# Patient Record
Sex: MALE | Race: WHITE | NOT HISPANIC OR LATINO | Employment: FULL TIME | URBAN - METROPOLITAN AREA
[De-identification: names, ages, dates, MRNs, and addresses within clinical notes are randomized per-mention and may not be internally consistent; named-entity substitution may affect disease eponyms.]

---

## 2017-02-06 RX ORDER — INSULIN GLARGINE 100 [IU]/ML
INJECTION, SOLUTION SUBCUTANEOUS
Qty: 30 ML | Refills: 1 | Status: SHIPPED | OUTPATIENT
Start: 2017-02-06 | End: 2017-02-10 | Stop reason: SDUPTHER

## 2017-02-07 ENCOUNTER — TELEPHONE (OUTPATIENT)
Dept: FAMILY MEDICINE | Facility: CLINIC | Age: 57
End: 2017-02-07

## 2017-02-07 NOTE — TELEPHONE ENCOUNTER
----- Message from Clarissa Henry sent at 2/7/2017  8:25 AM CST -----  Contact: self  [phone]  Pt is returning your office call. Pls call pt [phone]. Thanks...............Yina

## 2017-02-10 ENCOUNTER — LAB VISIT (OUTPATIENT)
Dept: LAB | Facility: HOSPITAL | Age: 57
End: 2017-02-10
Attending: FAMILY MEDICINE
Payer: COMMERCIAL

## 2017-02-10 ENCOUNTER — OFFICE VISIT (OUTPATIENT)
Dept: FAMILY MEDICINE | Facility: CLINIC | Age: 57
End: 2017-02-10
Payer: COMMERCIAL

## 2017-02-10 VITALS
DIASTOLIC BLOOD PRESSURE: 90 MMHG | HEART RATE: 82 BPM | SYSTOLIC BLOOD PRESSURE: 158 MMHG | OXYGEN SATURATION: 97 % | BODY MASS INDEX: 37.3 KG/M2 | WEIGHT: 266.44 LBS | HEIGHT: 71 IN | TEMPERATURE: 98 F

## 2017-02-10 DIAGNOSIS — M10.00 IDIOPATHIC GOUT, UNSPECIFIED CHRONICITY, UNSPECIFIED SITE: ICD-10-CM

## 2017-02-10 DIAGNOSIS — E78.5 HYPERLIPIDEMIA, UNSPECIFIED HYPERLIPIDEMIA TYPE: ICD-10-CM

## 2017-02-10 DIAGNOSIS — Z23 NEED FOR PROPHYLACTIC VACCINATION AND INOCULATION AGAINST INFLUENZA: ICD-10-CM

## 2017-02-10 DIAGNOSIS — I10 ESSENTIAL HYPERTENSION: ICD-10-CM

## 2017-02-10 PROCEDURE — 2022F DILAT RTA XM EVC RTNOPTHY: CPT | Mod: S$GLB,,, | Performed by: FAMILY MEDICINE

## 2017-02-10 PROCEDURE — 83036 HEMOGLOBIN GLYCOSYLATED A1C: CPT

## 2017-02-10 PROCEDURE — 4010F ACE/ARB THERAPY RXD/TAKEN: CPT | Mod: S$GLB,,, | Performed by: FAMILY MEDICINE

## 2017-02-10 PROCEDURE — 3080F DIAST BP >= 90 MM HG: CPT | Mod: S$GLB,,, | Performed by: FAMILY MEDICINE

## 2017-02-10 PROCEDURE — 3045F PR MOST RECENT HEMOGLOBIN A1C LEVEL 7.0-9.0%: CPT | Mod: S$GLB,,, | Performed by: FAMILY MEDICINE

## 2017-02-10 PROCEDURE — 90686 IIV4 VACC NO PRSV 0.5 ML IM: CPT | Mod: S$GLB,,, | Performed by: FAMILY MEDICINE

## 2017-02-10 PROCEDURE — 3077F SYST BP >= 140 MM HG: CPT | Mod: S$GLB,,, | Performed by: FAMILY MEDICINE

## 2017-02-10 PROCEDURE — 90471 IMMUNIZATION ADMIN: CPT | Mod: S$GLB,,, | Performed by: FAMILY MEDICINE

## 2017-02-10 PROCEDURE — 99214 OFFICE O/P EST MOD 30 MIN: CPT | Mod: 25,S$GLB,, | Performed by: FAMILY MEDICINE

## 2017-02-10 PROCEDURE — 99999 PR PBB SHADOW E&M-EST. PATIENT-LVL III: CPT | Mod: PBBFAC,,, | Performed by: FAMILY MEDICINE

## 2017-02-10 PROCEDURE — 36415 COLL VENOUS BLD VENIPUNCTURE: CPT | Mod: PO

## 2017-02-10 RX ORDER — ALLOPURINOL 100 MG/1
100 TABLET ORAL DAILY
Qty: 90 TABLET | Refills: 3 | Status: SHIPPED | OUTPATIENT
Start: 2017-02-10 | End: 2018-04-17 | Stop reason: SDUPTHER

## 2017-02-10 RX ORDER — INSULIN LISPRO 100 [IU]/ML
35 INJECTION, SOLUTION INTRAVENOUS; SUBCUTANEOUS
Qty: 10 VIAL | Refills: 6 | Status: SHIPPED | OUTPATIENT
Start: 2017-02-10 | End: 2018-07-16 | Stop reason: SDUPTHER

## 2017-02-10 RX ORDER — METFORMIN HYDROCHLORIDE 1000 MG/1
1000 TABLET ORAL 2 TIMES DAILY WITH MEALS
Qty: 180 TABLET | Refills: 3 | Status: SHIPPED | OUTPATIENT
Start: 2017-02-10 | End: 2018-04-17 | Stop reason: SDUPTHER

## 2017-02-10 RX ORDER — ENALAPRIL MALEATE 20 MG/1
20 TABLET ORAL 2 TIMES DAILY
Qty: 180 TABLET | Refills: 3 | Status: SHIPPED | OUTPATIENT
Start: 2017-02-10 | End: 2018-07-16 | Stop reason: SDUPTHER

## 2017-02-10 RX ORDER — INSULIN GLARGINE 100 [IU]/ML
INJECTION, SOLUTION SUBCUTANEOUS
Qty: 30 ML | Refills: 1 | Status: SHIPPED | OUTPATIENT
Start: 2017-02-10 | End: 2017-06-22 | Stop reason: SDUPTHER

## 2017-02-10 RX ORDER — AMLODIPINE BESYLATE 10 MG/1
10 TABLET ORAL DAILY
Qty: 90 TABLET | Refills: 3 | Status: SHIPPED | OUTPATIENT
Start: 2017-02-10 | End: 2018-01-16 | Stop reason: SDUPTHER

## 2017-02-10 RX ORDER — ATENOLOL AND CHLORTHALIDONE TABLET 50; 25 MG/1; MG/1
1 TABLET ORAL DAILY
Qty: 90 TABLET | Refills: 3 | Status: SHIPPED | OUTPATIENT
Start: 2017-02-10 | End: 2018-04-17 | Stop reason: SDUPTHER

## 2017-02-10 RX ORDER — ATORVASTATIN CALCIUM 40 MG/1
40 TABLET, FILM COATED ORAL DAILY
Qty: 90 TABLET | Refills: 3 | Status: SHIPPED | OUTPATIENT
Start: 2017-02-10 | End: 2018-07-16 | Stop reason: SDUPTHER

## 2017-02-10 NOTE — PROGRESS NOTES
[facility]  Progress Note    SUBJECTIVE:     Chief Complaint   Patient presents with    [facility]    Diabetes       HPI   Connor Zuluaga  is a 56 y.o. male here to establish care and for follow up of his diabetes, HTN. He admits been taking his meds but been eating/drinking bad. He drinks diet coke daily. He is trying to cut back. Patient has no other new complaints/problems at this time.      Review of patient's allergies indicates:  No Known Allergies    Past Medical History   Diagnosis Date    Diabetes mellitus, type 2     Gout     Hyperlipidemia     Hypertension      Past Surgical History   Procedure Laterality Date    Tonsillectomy       Family History   Problem Relation Age of Onset    Hyperlipidemia Mother     Hypertension Mother     Diabetes Mother     Hyperlipidemia Father     Hypertension Father     Diabetes Father     Cancer Father      prostate, skin cancer, polyps in intestine     Social History   Substance Use Topics    Smoking status: Current Every Day Smoker     Packs/day: 1.00    Smokeless tobacco: Never Used    Alcohol use Yes        Review of Systems   Constitutional: Negative for chills, fever and malaise/fatigue.   HENT: Negative.    Respiratory: Negative.  Negative for cough and shortness of breath.    Cardiovascular: Negative.  Negative for chest pain.   Gastrointestinal: Negative.  Negative for abdominal pain, nausea and vomiting.   Genitourinary: Negative.    Neurological: Negative for weakness and headaches.   All other systems reviewed and are negative.    OBJECTIVE:     Vitals:    02/10/17 1434   BP: (!) 158/90   Pulse: 82   Temp: 98.2 °F (36.8 °C)     Body mass index is 37.16 kg/(m^2).    Physical Exam   Constitutional: He is oriented to person, place, and time and well-developed, well-nourished, and in no distress. No distress.   HENT:   Head: Normocephalic and atraumatic.   Eyes: Conjunctivae and EOM are normal.   Cardiovascular: Normal rate, regular  rhythm and normal heart sounds.  Exam reveals no gallop and no friction rub.    No murmur heard.  Pulmonary/Chest: Effort normal and breath sounds normal. No respiratory distress. He has no wheezes. He has no rales.   Abdominal: Soft. Bowel sounds are normal. He exhibits no distension. There is no tenderness.   Neurological: He is alert and oriented to person, place, and time.   Skin: Skin is warm. He is not diaphoretic.     Protective Sensation (w/ 10 gram monofilament):  Right: Intact  Left: Intact    Visual Inspection:  Normal -  Bilateral    Pedal Pulses:   Right: Present  Left: Present    Posterior tibialis:   Right:Present  Left: Present        Old records were reviewed. Labs and/or images were independently reviewed.    ASSESSMENT     1. Type 2 diabetes, uncontrolled, with neuropathy    2. Essential hypertension    3. Idiopathic gout, unspecified chronicity, unspecified site    4. Hyperlipidemia, unspecified hyperlipidemia type    5. Need for prophylactic vaccination and inoculation against influenza        PLAN:     Type 2 diabetes, uncontrolled, with neuropathy  -     insulin glargine (LANTUS) 100 unit/mL injection; INJECT 85 UNITS UNDER THE SKIN AT BEDTIME  Dispense: 30 mL; Refill: 1  -     insulin lispro (HUMALOG) 100 unit/mL injection; Inject 35 Units into the skin 3 (three) times daily before meals.  Dispense: 10 vial; Refill: 6  -     Increase metformin (GLUCOPHAGE) 1000 MG tablet; Take 1 tablet (1,000 mg total) by mouth 2 (two) times daily with meals.  Dispense: 180 tablet; Refill: 3  -     Ambulatory referral to Optometry  -     Diabetic Eye Screening Photo; Future  -     Instructed patient to take daily glucose AM logs and to write them down to bring with on next visit. Advised patient to decrease intake of carbohydrates/simple sugars.         Essential hypertension  -     atenolol-chlorthalidone (TENORETIC) 50-25 mg Tab; Take 1 tablet by mouth once daily.  Dispense: 90 tablet; Refill: 3  -      enalapril (VASOTEC) 20 MG tablet; Take 1 tablet (20 mg total) by mouth 2 (two) times daily.  Dispense: 180 tablet; Refill: 3  -     Increase amlodipine (NORVASC) 10 MG tablet; Take 1 tablet (10 mg total) by mouth once daily.  Dispense: 90 tablet; Refill: 3  -     Educated patient to take medications as prescribed, and to record bp logs daily to bring along to next visit. Instructed patient to decrease salt intake. Also told patient to call if any new signs or symptoms develop.    Idiopathic gout, unspecified chronicity, unspecified site  -     allopurinol (ZYLOPRIM) 100 MG tablet; Take 1 tablet (100 mg total) by mouth once daily.  Dispense: 90 tablet; Refill: 3   -     Stable. Continue current regimen.    Hyperlipidemia, unspecified hyperlipidemia type  -     atorvastatin (LIPITOR) 40 MG tablet; Take 1 tablet (40 mg total) by mouth once daily.  Dispense: 90 tablet; Refill: 3  -     Stable. Continue current regimen.    Need for prophylactic vaccination and inoculation against influenza  -     Influenza - Quadrivalent (3 years & older) (PF)      RTC PRN or 1 month for BP, DM follow-up.    Zaire Rodrigues MD  02/10/2017 2:49 PM

## 2017-02-11 LAB
ESTIMATED AVG GLUCOSE: 226 MG/DL
HBA1C MFR BLD HPLC: 9.5 %

## 2017-02-17 ENCOUNTER — OFFICE VISIT (OUTPATIENT)
Dept: OPTOMETRY | Facility: CLINIC | Age: 57
End: 2017-02-17
Payer: COMMERCIAL

## 2017-02-17 DIAGNOSIS — H52.7 REFRACTIVE ERROR: ICD-10-CM

## 2017-02-17 DIAGNOSIS — E11.9 TYPE 2 DIABETES MELLITUS WITHOUT OPHTHALMIC MANIFESTATIONS: Primary | ICD-10-CM

## 2017-02-17 DIAGNOSIS — H25.13 NUCLEAR SCLEROSIS, BILATERAL: ICD-10-CM

## 2017-02-17 PROCEDURE — 92004 COMPRE OPH EXAM NEW PT 1/>: CPT | Mod: S$GLB,,, | Performed by: OPTOMETRIST

## 2017-02-17 PROCEDURE — 92015 DETERMINE REFRACTIVE STATE: CPT | Mod: S$GLB,,, | Performed by: OPTOMETRIST

## 2017-02-17 PROCEDURE — 99999 PR PBB SHADOW E&M-EST. PATIENT-LVL II: CPT | Mod: PBBFAC,,, | Performed by: OPTOMETRIST

## 2017-02-17 NOTE — LETTER
February 17, 2017      Zaire Rodrigues MD  [address]  [place] [address]           [facility]  [address]  [place] [address]  Phone: [phone]  Fax: [phone]          Patient: Connor Zuluaga   MR Number: [id number]   YOB: 1960   Date of Visit: 2/17/2017       Dear Dr. Zaire Rodrigues:    Thank you for referring Connor Zuluaga to me for evaluation. Attached you will find relevant portions of my assessment and plan of care.    If you have questions, please do not hesitate to call me. I look forward to following Connor Zuluaga along with you.    Sincerely,    Yvonne Chrisite, OD    Enclosure  CC:  No Recipients    If you would like to receive this communication electronically, please contact [email or web address] or [phone] to request more information on [facility] Link access.    For providers and/or their staff who would like to refer a patient to [facility], please contact us through our one-stop-shop provider referral line, [facility] [place], at [phone].    If you feel you have received this communication in error or would no longer like to receive these types of communications, please e-mail [email or web address]

## 2017-02-17 NOTE — MR AVS SNAPSHOT
[facility]  [address]  [place] [address]  Phone: [phone]  Fax: [phone]                  Connor Zuluaga   2017 2:00 PM   Office Visit    Description:  Male : 1960   Provider:  Yvonne Christie OD   Department:  [facility]           Reason for Visit     Concerns About Ocular Health     Diabetic Eye Exam     Hypertensive Eye Exam           Diagnoses this Visit        Comments    Type 2 diabetes mellitus without ophthalmic manifestations    -  Primary     Nuclear sclerosis, bilateral         Refractive error                To Do List           Goals (5 Years of Data)     None      Follow-Up and Disposition     Return in about 1 year (around 2018) for Diabetic Eye Exam.      [facility] On Call     [facility] On Call Nurse Care Line -  Assistance  Registered nurses in the [facility] On Call Center provide clinical advisement, health education, appointment booking, and other advisory services.  Call for this free service at [phone].             Medications           Message regarding Medications     Verify the changes and/or additions to your medication regime listed below are the same as discussed with your clinician today.  If any of these changes or additions are incorrect, please notify your healthcare provider.             Verify that the below list of medications is an accurate representation of the medications you are currently taking.  If none reported, the list may be blank. If incorrect, please contact your healthcare provider. Carry this list with you in case of emergency.           Current Medications     allopurinol (ZYLOPRIM) 100 MG tablet Take 1 tablet (100 mg total) by mouth once daily.    amlodipine (NORVASC) 10 MG tablet Take 1 tablet (10 mg total) by mouth once daily.    atenolol-chlorthalidone (TENORETIC) 50-25 mg Tab Take 1 tablet by mouth once daily.    atorvastatin (LIPITOR) 40 MG tablet Take 1 tablet (40 mg total) by mouth once daily.     enalapril (VASOTEC) 20 MG tablet Take 1 tablet (20 mg total) by mouth 2 (two) times daily.    insulin glargine (LANTUS) 100 unit/mL injection INJECT 85 UNITS UNDER THE SKIN AT BEDTIME    insulin lispro (HUMALOG) 100 unit/mL injection Inject 35 Units into the skin 3 (three) times daily before meals.    metformin (GLUCOPHAGE) 1000 MG tablet Take 1 tablet (1,000 mg total) by mouth 2 (two) times daily with meals.    omeprazole (PRILOSEC) 40 MG capsule Take 1 capsule (40 mg total) by mouth once daily.           Clinical Reference Information           Allergies as of 2/17/2017     No Known Allergies      Immunizations Administered on Date of Encounter - 2/17/2017     None      [facility] Sign-Up     Activating your [facility] account is as easy as 1-2-3!     1) Visit [email or web address], select Sign Up Now, enter this activation code and your date of birth, then select Next.  [email or web address]  Expires: 4/3/2017  4:05 PM      2) Create a username and password to use when you visit [facility] in the future and select a security question in case you lose your password and select Next.    3) Enter your e-mail address and click Sign Up!    Additional Information  If you have questions, please e-mail [email or web address] or call [phone] to talk to our [facility] staff. Remember, [facility] is NOT to be used for urgent needs. For medical emergencies, dial 911.         Smoking Cessation     If you would like to quit smoking:   You may be eligible for free services if you are a [place] resident and started smoking cigarettes before September 1, 1988.  Call the [employer]oking [employer]essation [employer] (SCT) toll free at (814) 334-8319 or (272) 830-8460.   Call 9-523-QUIT-NOW if you do not meet the above criteria.            Language Assistance Services     ATTENTION: Language assistance services are available, free of charge. Please call [phone].      ATENCIÓN: Si habla español, tiene a sales disposición servicios gratuitos de asistencia  lingüística. Herbie al [phone].     PETER Ý: N?u b?n nói Ti?ng Vi?t, có các d?ch v? h? tr? ngôn ng? mi?n phí dành cho b?n. G?i s? [phone].         [facility] complies with applicable Federal civil rights laws and does not discriminate on the basis of race, color, national origin, age, disability, or sex.

## 2017-02-17 NOTE — PROGRESS NOTES
HPI     Dls: 3 yrs [place]     Pt here for diabetic and hypertensive eye exam.   Pt c/o blurry vision at distance and near ou. Pt wears pal's. Pt states no   tearing no itching no burning no  Pain no ha's no floaters.     Eye meds:   None    Hemoglobin A1C       Date                     Value               Ref Range             Status                02/10/2017               9.5 (H)             4.5 - 6.2 %           Final                 05/20/2016               8.8 (H)             4.5 - 6.2 %           Final            ----------    LBS - 220s per patient    Family OHx  (--) glaucoma  (--) AMD         Last edited by Yvonne Christie, OD on 2/17/2017  2:41 PM.     Assessment /Plan     For exam results, see Encounter Report.    Type 2 diabetes mellitus without ophthalmic manifestations  - No diabetic retinopathy. Educated patient on importance of good blood sugar control, compliance with meds, and follow up care with PCP. Return in 1 year for dilated eye exam, sooner PRN.    Nuclear sclerosis, bilateral  - Not visually significant. Educated pt on presence of cataracts and effects on vision. No surgery at this time. Recheck in one year.    Refractive error  - Educated patient on refractive error and discussed lens options. Gave pt Rx for specs. RTC in 1 year.    Eyeglass Final Rx     Eyeglass Final Rx      Sphere Cylinder Axis Add   Right -5.00 +0.50 130 +2.00   Left -4.00 +0.75 045 +2.00       Type:  PAL    Expiration Date:  2/18/2018

## 2017-06-22 RX ORDER — INSULIN GLARGINE 100 [IU]/ML
INJECTION, SOLUTION SUBCUTANEOUS
Qty: 30 ML | Refills: 3 | Status: SHIPPED | OUTPATIENT
Start: 2017-06-22 | End: 2017-11-08 | Stop reason: SDUPTHER

## 2017-07-06 DIAGNOSIS — E11.9 TYPE 2 DIABETES MELLITUS WITHOUT COMPLICATION: ICD-10-CM

## 2017-07-07 DIAGNOSIS — K21.9 GASTROESOPHAGEAL REFLUX DISEASE WITHOUT ESOPHAGITIS: ICD-10-CM

## 2017-07-08 RX ORDER — OMEPRAZOLE 40 MG/1
40 CAPSULE, DELAYED RELEASE ORAL DAILY
Qty: 90 CAPSULE | Refills: 3 | Status: SHIPPED | OUTPATIENT
Start: 2017-07-08 | End: 2018-07-16 | Stop reason: SDUPTHER

## 2017-09-01 DIAGNOSIS — E11.9 TYPE 2 DIABETES MELLITUS WITHOUT COMPLICATION: ICD-10-CM

## 2017-11-08 RX ORDER — INSULIN GLARGINE 100 [IU]/ML
INJECTION, SOLUTION SUBCUTANEOUS
Qty: 30 ML | Refills: 2 | Status: SHIPPED | OUTPATIENT
Start: 2017-11-08 | End: 2018-02-23 | Stop reason: SDUPTHER

## 2017-11-21 ENCOUNTER — LAB VISIT (OUTPATIENT)
Dept: LAB | Facility: HOSPITAL | Age: 57
End: 2017-11-21
Attending: FAMILY MEDICINE
Payer: COMMERCIAL

## 2017-11-21 ENCOUNTER — OFFICE VISIT (OUTPATIENT)
Dept: FAMILY MEDICINE | Facility: CLINIC | Age: 57
End: 2017-11-21
Payer: COMMERCIAL

## 2017-11-21 VITALS
HEIGHT: 71 IN | OXYGEN SATURATION: 96 % | TEMPERATURE: 98 F | BODY MASS INDEX: 36.82 KG/M2 | SYSTOLIC BLOOD PRESSURE: 149 MMHG | HEART RATE: 81 BPM | WEIGHT: 263 LBS | DIASTOLIC BLOOD PRESSURE: 89 MMHG

## 2017-11-21 DIAGNOSIS — Z00.00 ROUTINE PHYSICAL EXAMINATION: ICD-10-CM

## 2017-11-21 DIAGNOSIS — E78.5 HYPERLIPIDEMIA, UNSPECIFIED HYPERLIPIDEMIA TYPE: ICD-10-CM

## 2017-11-21 DIAGNOSIS — Z00.00 ROUTINE PHYSICAL EXAMINATION: Primary | ICD-10-CM

## 2017-11-21 DIAGNOSIS — I10 ESSENTIAL HYPERTENSION: ICD-10-CM

## 2017-11-21 DIAGNOSIS — Z23 PNEUMOCOCCAL VACCINATION ADMINISTERED AT CURRENT VISIT: ICD-10-CM

## 2017-11-21 LAB
ALBUMIN SERPL BCP-MCNC: 4.1 G/DL
ALP SERPL-CCNC: 108 U/L
ALT SERPL W/O P-5'-P-CCNC: 32 U/L
ANION GAP SERPL CALC-SCNC: 10 MMOL/L
AST SERPL-CCNC: 24 U/L
BASOPHILS # BLD AUTO: 0.05 K/UL
BASOPHILS NFR BLD: 0.6 %
BILIRUB SERPL-MCNC: 0.8 MG/DL
BUN SERPL-MCNC: 13 MG/DL
CALCIUM SERPL-MCNC: 10.2 MG/DL
CHLORIDE SERPL-SCNC: 99 MMOL/L
CHOLEST SERPL-MCNC: 182 MG/DL
CHOLEST/HDLC SERPL: 4.7 {RATIO}
CO2 SERPL-SCNC: 31 MMOL/L
COMPLEXED PSA SERPL-MCNC: 0.49 NG/ML
CREAT SERPL-MCNC: 0.9 MG/DL
DIFFERENTIAL METHOD: ABNORMAL
EOSINOPHIL # BLD AUTO: 0.2 K/UL
EOSINOPHIL NFR BLD: 2.5 %
ERYTHROCYTE [DISTWIDTH] IN BLOOD BY AUTOMATED COUNT: 13.4 %
EST. GFR  (AFRICAN AMERICAN): >60 ML/MIN/1.73 M^2
EST. GFR  (NON AFRICAN AMERICAN): >60 ML/MIN/1.73 M^2
ESTIMATED AVG GLUCOSE: 143 MG/DL
GLUCOSE SERPL-MCNC: 114 MG/DL
HBA1C MFR BLD HPLC: 6.6 %
HCT VFR BLD AUTO: 43.6 %
HCV AB SERPL QL IA: NEGATIVE
HDLC SERPL-MCNC: 39 MG/DL
HDLC SERPL: 21.4 %
HGB BLD-MCNC: 14.5 G/DL
IMM GRANULOCYTES # BLD AUTO: 0.07 K/UL
IMM GRANULOCYTES NFR BLD AUTO: 0.8 %
LDLC SERPL CALC-MCNC: 113.6 MG/DL
LYMPHOCYTES # BLD AUTO: 2.2 K/UL
LYMPHOCYTES NFR BLD: 25.1 %
MCH RBC QN AUTO: 30.2 PG
MCHC RBC AUTO-ENTMCNC: 33.3 G/DL
MCV RBC AUTO: 91 FL
MONOCYTES # BLD AUTO: 0.5 K/UL
MONOCYTES NFR BLD: 5.6 %
NEUTROPHILS # BLD AUTO: 5.7 K/UL
NEUTROPHILS NFR BLD: 65.4 %
NONHDLC SERPL-MCNC: 143 MG/DL
NRBC BLD-RTO: 0 /100 WBC
PLATELET # BLD AUTO: 246 K/UL
PMV BLD AUTO: 10.3 FL
POTASSIUM SERPL-SCNC: 4.1 MMOL/L
PROT SERPL-MCNC: 7.7 G/DL
RBC # BLD AUTO: 4.8 M/UL
SODIUM SERPL-SCNC: 140 MMOL/L
T4 FREE SERPL-MCNC: 0.89 NG/DL
TRIGL SERPL-MCNC: 147 MG/DL
TSH SERPL DL<=0.005 MIU/L-ACNC: 0.96 UIU/ML
WBC # BLD AUTO: 8.68 K/UL

## 2017-11-21 PROCEDURE — 84439 ASSAY OF FREE THYROXINE: CPT

## 2017-11-21 PROCEDURE — 83036 HEMOGLOBIN GLYCOSYLATED A1C: CPT

## 2017-11-21 PROCEDURE — 90471 IMMUNIZATION ADMIN: CPT | Mod: S$GLB,,, | Performed by: FAMILY MEDICINE

## 2017-11-21 PROCEDURE — 84443 ASSAY THYROID STIM HORMONE: CPT

## 2017-11-21 PROCEDURE — 86803 HEPATITIS C AB TEST: CPT

## 2017-11-21 PROCEDURE — 80061 LIPID PANEL: CPT

## 2017-11-21 PROCEDURE — 99999 PR PBB SHADOW E&M-EST. PATIENT-LVL III: CPT | Mod: PBBFAC,,, | Performed by: FAMILY MEDICINE

## 2017-11-21 PROCEDURE — 99396 PREV VISIT EST AGE 40-64: CPT | Mod: 25,S$GLB,, | Performed by: FAMILY MEDICINE

## 2017-11-21 PROCEDURE — 90732 PPSV23 VACC 2 YRS+ SUBQ/IM: CPT | Mod: S$GLB,,, | Performed by: FAMILY MEDICINE

## 2017-11-21 PROCEDURE — 36415 COLL VENOUS BLD VENIPUNCTURE: CPT | Mod: PO

## 2017-11-21 PROCEDURE — 80053 COMPREHEN METABOLIC PANEL: CPT

## 2017-11-21 PROCEDURE — 84153 ASSAY OF PSA TOTAL: CPT

## 2017-11-21 PROCEDURE — 85025 COMPLETE CBC W/AUTO DIFF WBC: CPT

## 2017-11-21 RX ORDER — OXYCODONE AND ACETAMINOPHEN 7.5; 325 MG/1; MG/1
TABLET ORAL
Refills: 0 | COMMUNITY
Start: 2017-09-25

## 2017-11-21 RX ORDER — ENOXAPARIN SODIUM 100 MG/ML
INJECTION SUBCUTANEOUS
Refills: 0 | COMMUNITY
Start: 2017-09-18

## 2017-11-21 NOTE — PROGRESS NOTES
Pneumonia-23 vaccine administered, aleah well. Instructed to wait 15mins for observation, no reaction noted @ time of discharge.

## 2017-11-22 NOTE — PROGRESS NOTES
[facility]  Progress Note    SUBJECTIVE:     Chief Complaint   Patient presents with    Diabetes       HPI   Connor Zuluaga  is a 57 y.o. male here for routine physical exam and for follow-up of his diabetes. Patient has no other new complaints/problems at this time.      Review of patient's allergies indicates:  No Known Allergies    Past Medical History:   Diagnosis Date    Diabetes mellitus, type 2     Gout     Hyperlipidemia     Hypertension      Past Surgical History:   Procedure Laterality Date    RETINAL LASER PROCEDURE Left 5 yrs    TONSILLECTOMY       Family History   Problem Relation Age of Onset    Hyperlipidemia Mother     Hypertension Mother     Diabetes Mother     Cataracts Mother     Hyperlipidemia Father     Hypertension Father     Diabetes Father     Cancer Father      prostate, skin cancer, polyps in intestine    No Known Problems Sister     No Known Problems Brother     No Known Problems Maternal Aunt     No Known Problems Maternal Uncle     No Known Problems Paternal Aunt     No Known Problems Paternal Uncle     No Known Problems Maternal Grandmother     No Known Problems Maternal Grandfather     No Known Problems Paternal Grandmother     No Known Problems Paternal Grandfather     Amblyopia Neg Hx     Blindness Neg Hx     Glaucoma Neg Hx     Macular degeneration Neg Hx     Retinal detachment Neg Hx     Strabismus Neg Hx     Stroke Neg Hx     Thyroid disease Neg Hx      Social History   Substance Use Topics    Smoking status: Current Every Day Smoker     Packs/day: 1.00    Smokeless tobacco: Never Used    Alcohol use Yes        Review of Systems   Constitutional: Negative for chills, diaphoresis and fever.   HENT: Negative for congestion, ear pain and sore throat.    Eyes: Negative for photophobia and discharge.   Respiratory: Negative for cough, shortness of breath and wheezing.    Cardiovascular: Negative for chest pain and palpitations.    Gastrointestinal: Negative for abdominal pain, constipation, diarrhea, nausea and vomiting.   Genitourinary: Negative for dysuria and hematuria.   Musculoskeletal: Negative for back pain and myalgias.   Skin: Negative for itching and rash.   Neurological: Negative for dizziness, sensory change, focal weakness, weakness and headaches.   All other systems reviewed and are negative.    OBJECTIVE:     Vitals:    11/21/17 0919   BP: (!) 149/89   Pulse: 81   Temp: 98.2 °F (36.8 °C)     Body mass index is 36.68 kg/m².    Physical Exam   Constitutional: He is oriented to person, place, and time and well-developed, well-nourished, and in no distress. No distress.   HENT:   Head: Normocephalic and atraumatic.   Right Ear: Tympanic membrane is not perforated, not erythematous and not bulging. No hemotympanum.   Left Ear: Tympanic membrane is not perforated, not erythematous and not bulging. No hemotympanum.   Mouth/Throat: Oropharynx is clear and moist. No oropharyngeal exudate.   Eyes: Conjunctivae and EOM are normal. Pupils are equal, round, and reactive to light.   Neck: No thyromegaly present.   Cardiovascular: Normal rate, regular rhythm and normal heart sounds.  Exam reveals no gallop and no friction rub.    No murmur heard.  Pulmonary/Chest: Effort normal and breath sounds normal. No respiratory distress. He has no wheezes. He has no rales.   Abdominal: Soft. Bowel sounds are normal. He exhibits no distension. There is no tenderness. There is no rebound and no guarding.   Musculoskeletal: Normal range of motion. He exhibits no edema or tenderness.   Lymphadenopathy:     He has no cervical adenopathy.   Neurological: He is alert and oriented to person, place, and time.   Skin: Skin is warm. No rash noted. He is not diaphoretic. No erythema.       Old records were reviewed. Labs and/or images were independently reviewed.    ASSESSMENT     1. Routine physical examination    2. Essential hypertension    3. Hyperlipidemia,  unspecified hyperlipidemia type    4. Type 2 diabetes, uncontrolled, with neuropathy    5. Pneumococcal vaccination administered at current visit        PLAN:     Routine physical examination  -     CBC auto differential; Future  -     Comprehensive metabolic panel; Future  -     Hemoglobin A1c; Future  -     Lipid panel; Future  -     TSH; Future  -     T4, free; Future  -     PSA, Screening; Future; Expected date: 11/21/2017  -     Hepatitis C antibody; Future; Expected date: 12/05/2017  -     We briefly discussed diet, exercise, and routine preventive exams. All questions and comments addressed.    Essential hypertension   -     Educated patient to take medications as prescribed, and to record bp logs daily to bring along to next visit. Instructed patient to decrease salt intake. Also told patient to call if any new signs or symptoms develop.    Hyperlipidemia, unspecified hyperlipidemia type   -     Counseled patient about healthy diet, exercise habits, and to increase physical activity.    Type 2 diabetes, uncontrolled, with neuropathy   -     Instructed patient to take daily glucose AM logs and to write them down to bring with on next visit. Advised patient to decrease intake of carbohydrates/simple sugars.         Pneumococcal vaccination administered at current visit  -     Pneumococcal Polysaccharide Vaccine (23 Valent) (SQ/IM)      RTC PRADRIEL Rodrigues MD  11/21/2017 10:59 PM

## 2018-01-16 DIAGNOSIS — I10 ESSENTIAL HYPERTENSION: ICD-10-CM

## 2018-01-16 RX ORDER — AMLODIPINE BESYLATE 10 MG/1
TABLET ORAL
Qty: 90 TABLET | Refills: 3 | Status: SHIPPED | OUTPATIENT
Start: 2018-01-16 | End: 2018-11-21 | Stop reason: SDUPTHER

## 2018-02-23 RX ORDER — INSULIN GLARGINE 100 [IU]/ML
INJECTION, SOLUTION SUBCUTANEOUS
Qty: 30 ML | Refills: 3 | Status: SHIPPED | OUTPATIENT
Start: 2018-02-23 | End: 2018-07-16 | Stop reason: SDUPTHER

## 2018-04-17 DIAGNOSIS — I10 ESSENTIAL HYPERTENSION: ICD-10-CM

## 2018-04-17 DIAGNOSIS — M10.00 IDIOPATHIC GOUT, UNSPECIFIED CHRONICITY, UNSPECIFIED SITE: ICD-10-CM

## 2018-04-17 RX ORDER — ALLOPURINOL 100 MG/1
TABLET ORAL
Qty: 90 TABLET | Refills: 3 | Status: SHIPPED | OUTPATIENT
Start: 2018-04-17 | End: 2018-11-21 | Stop reason: SDUPTHER

## 2018-04-17 RX ORDER — ATENOLOL AND CHLORTHALIDONE TABLET 50; 25 MG/1; MG/1
1 TABLET ORAL DAILY
Qty: 90 TABLET | Refills: 3 | Status: SHIPPED | OUTPATIENT
Start: 2018-04-17 | End: 2018-11-21 | Stop reason: SDUPTHER

## 2018-04-17 RX ORDER — METFORMIN HYDROCHLORIDE 1000 MG/1
TABLET ORAL
Qty: 180 TABLET | Refills: 3 | Status: SHIPPED | OUTPATIENT
Start: 2018-04-17 | End: 2018-11-21 | Stop reason: SDUPTHER

## 2018-07-16 DIAGNOSIS — K21.9 GASTROESOPHAGEAL REFLUX DISEASE WITHOUT ESOPHAGITIS: ICD-10-CM

## 2018-07-16 DIAGNOSIS — E78.5 HYPERLIPIDEMIA, UNSPECIFIED HYPERLIPIDEMIA TYPE: ICD-10-CM

## 2018-07-16 DIAGNOSIS — I10 ESSENTIAL HYPERTENSION: ICD-10-CM

## 2018-07-16 RX ORDER — OMEPRAZOLE 40 MG/1
CAPSULE, DELAYED RELEASE ORAL
Qty: 90 CAPSULE | Refills: 0 | Status: SHIPPED | OUTPATIENT
Start: 2018-07-16 | End: 2018-10-17 | Stop reason: SDUPTHER

## 2018-07-16 RX ORDER — INSULIN LISPRO 100 [IU]/ML
35 INJECTION, SOLUTION INTRAVENOUS; SUBCUTANEOUS
Qty: 40 ML | Refills: 3 | Status: SHIPPED | OUTPATIENT
Start: 2018-07-16 | End: 2018-11-21 | Stop reason: SDUPTHER

## 2018-07-16 RX ORDER — ATORVASTATIN CALCIUM 40 MG/1
TABLET, FILM COATED ORAL
Qty: 90 TABLET | Refills: 3 | Status: SHIPPED | OUTPATIENT
Start: 2018-07-16 | End: 2018-11-21 | Stop reason: SDUPTHER

## 2018-07-16 RX ORDER — ENALAPRIL MALEATE 20 MG/1
TABLET ORAL
Qty: 180 TABLET | Refills: 3 | Status: SHIPPED | OUTPATIENT
Start: 2018-07-16 | End: 2018-11-21 | Stop reason: SDUPTHER

## 2018-07-16 RX ORDER — INSULIN GLARGINE 100 [IU]/ML
INJECTION, SOLUTION SUBCUTANEOUS
Qty: 30 ML | Refills: 3 | Status: SHIPPED | OUTPATIENT
Start: 2018-07-16 | End: 2018-11-21 | Stop reason: SDUPTHER

## 2018-07-19 DIAGNOSIS — E11.9 TYPE 2 DIABETES MELLITUS WITHOUT COMPLICATION: ICD-10-CM

## 2018-10-17 DIAGNOSIS — K21.9 GASTROESOPHAGEAL REFLUX DISEASE WITHOUT ESOPHAGITIS: ICD-10-CM

## 2018-10-17 RX ORDER — OMEPRAZOLE 40 MG/1
CAPSULE, DELAYED RELEASE ORAL
Qty: 90 CAPSULE | Refills: 0 | Status: SHIPPED | OUTPATIENT
Start: 2018-10-17 | End: 2018-11-21 | Stop reason: SDUPTHER

## 2018-11-12 RX ORDER — INSULIN GLARGINE 100 [IU]/ML
INJECTION, SOLUTION SUBCUTANEOUS
Qty: 30 ML | Refills: 3 | OUTPATIENT
Start: 2018-11-12

## 2018-11-12 RX ORDER — INSULIN LISPRO 100 [IU]/ML
35 INJECTION, SOLUTION INTRAVENOUS; SUBCUTANEOUS
Qty: 40 ML | Refills: 3 | OUTPATIENT
Start: 2018-11-12

## 2018-11-13 RX ORDER — INSULIN LISPRO 100 [IU]/ML
35 INJECTION, SOLUTION INTRAVENOUS; SUBCUTANEOUS
Qty: 40 ML | Refills: 3 | OUTPATIENT
Start: 2018-11-13

## 2018-11-13 RX ORDER — INSULIN GLARGINE 100 [IU]/ML
INJECTION, SOLUTION SUBCUTANEOUS
Qty: 30 ML | Refills: 3 | OUTPATIENT
Start: 2018-11-13

## 2018-11-15 ENCOUNTER — TELEPHONE (OUTPATIENT)
Dept: FAMILY MEDICINE | Facility: CLINIC | Age: 58
End: 2018-11-15

## 2018-11-15 NOTE — TELEPHONE ENCOUNTER
----- Message from Mayte Baker sent at 11/14/2018  4:42 PM CST -----  Contact: Self  Patient called to because the pharmacy stated that the Drs office rejected refills for listed medication. Patient called to ask what was the reason. Please call to discuss [phone]        HUMALOG U-100 INSULIN 100 unit/mL injection  LANTUS U-100 INSULIN 100 unit/mL injection            [facility] [place] [address]

## 2018-11-15 NOTE — TELEPHONE ENCOUNTER
Spoke with patient and informed him that he needs an office visit for med refill. Patient schedule appointment for 11/21/18. Appointment slip in the mail.

## 2018-11-16 DIAGNOSIS — E11.9 TYPE 2 DIABETES MELLITUS WITHOUT COMPLICATION, UNSPECIFIED WHETHER LONG TERM INSULIN USE: ICD-10-CM

## 2018-11-21 ENCOUNTER — OFFICE VISIT (OUTPATIENT)
Dept: FAMILY MEDICINE | Facility: CLINIC | Age: 58
End: 2018-11-21
Payer: COMMERCIAL

## 2018-11-21 ENCOUNTER — CLINICAL SUPPORT (OUTPATIENT)
Dept: OPHTHALMOLOGY | Facility: CLINIC | Age: 58
End: 2018-11-21
Attending: FAMILY MEDICINE
Payer: COMMERCIAL

## 2018-11-21 VITALS
HEART RATE: 75 BPM | HEIGHT: 71 IN | WEIGHT: 266.19 LBS | BODY MASS INDEX: 37.27 KG/M2 | SYSTOLIC BLOOD PRESSURE: 132 MMHG | OXYGEN SATURATION: 95 % | DIASTOLIC BLOOD PRESSURE: 82 MMHG | TEMPERATURE: 99 F

## 2018-11-21 DIAGNOSIS — M10.00 IDIOPATHIC GOUT, UNSPECIFIED CHRONICITY, UNSPECIFIED SITE: ICD-10-CM

## 2018-11-21 DIAGNOSIS — I10 ESSENTIAL HYPERTENSION: ICD-10-CM

## 2018-11-21 DIAGNOSIS — E11.3293 MILD NONPROLIFERATIVE DIABETIC RETINOPATHY OF BOTH EYES WITHOUT MACULAR EDEMA ASSOCIATED WITH TYPE 2 DIABETES MELLITUS: Primary | ICD-10-CM

## 2018-11-21 DIAGNOSIS — Z23 NEED FOR IMMUNIZATION AGAINST INFLUENZA: ICD-10-CM

## 2018-11-21 DIAGNOSIS — E78.5 HYPERLIPIDEMIA, UNSPECIFIED HYPERLIPIDEMIA TYPE: ICD-10-CM

## 2018-11-21 DIAGNOSIS — Z00.00 ROUTINE PHYSICAL EXAMINATION: Primary | ICD-10-CM

## 2018-11-21 DIAGNOSIS — K21.9 GASTROESOPHAGEAL REFLUX DISEASE WITHOUT ESOPHAGITIS: ICD-10-CM

## 2018-11-21 PROCEDURE — 90471 IMMUNIZATION ADMIN: CPT | Mod: S$GLB,,, | Performed by: FAMILY MEDICINE

## 2018-11-21 PROCEDURE — 92250 FUNDUS PHOTOGRAPHY W/I&R: CPT | Mod: S$GLB,,, | Performed by: OPHTHALMOLOGY

## 2018-11-21 PROCEDURE — 99396 PREV VISIT EST AGE 40-64: CPT | Mod: 25,S$GLB,, | Performed by: FAMILY MEDICINE

## 2018-11-21 PROCEDURE — 99999 PR PBB SHADOW E&M-EST. PATIENT-LVL III: CPT | Mod: PBBFAC,,, | Performed by: FAMILY MEDICINE

## 2018-11-21 PROCEDURE — 3075F SYST BP GE 130 - 139MM HG: CPT | Mod: CPTII,S$GLB,, | Performed by: FAMILY MEDICINE

## 2018-11-21 PROCEDURE — 90686 IIV4 VACC NO PRSV 0.5 ML IM: CPT | Mod: S$GLB,,, | Performed by: FAMILY MEDICINE

## 2018-11-21 PROCEDURE — 3079F DIAST BP 80-89 MM HG: CPT | Mod: CPTII,S$GLB,, | Performed by: FAMILY MEDICINE

## 2018-11-21 PROCEDURE — 3044F HG A1C LEVEL LT 7.0%: CPT | Mod: CPTII,S$GLB,, | Performed by: FAMILY MEDICINE

## 2018-11-21 PROCEDURE — 99999 PR PBB SHADOW E&M-EST. PATIENT-LVL II: CPT | Mod: PBBFAC,,,

## 2018-11-21 RX ORDER — ATORVASTATIN CALCIUM 40 MG/1
40 TABLET, FILM COATED ORAL DAILY
Qty: 90 TABLET | Refills: 3 | Status: SHIPPED | OUTPATIENT
Start: 2018-11-21 | End: 2020-01-09

## 2018-11-21 RX ORDER — INSULIN LISPRO 200 [IU]/ML
INJECTION, SOLUTION SUBCUTANEOUS
Qty: 1800 ML | Refills: 3 | Status: SHIPPED | OUTPATIENT
Start: 2018-11-21 | End: 2019-04-16 | Stop reason: SDUPTHER

## 2018-11-21 RX ORDER — INSULIN GLARGINE 100 [IU]/ML
INJECTION, SOLUTION SUBCUTANEOUS
Qty: 30 ML | Refills: 3 | Status: SHIPPED | OUTPATIENT
Start: 2018-11-21 | End: 2019-04-16 | Stop reason: SDUPTHER

## 2018-11-21 RX ORDER — INSULIN LISPRO 100 [IU]/ML
35 INJECTION, SOLUTION INTRAVENOUS; SUBCUTANEOUS
Qty: 40 ML | Refills: 3 | Status: SHIPPED | OUTPATIENT
Start: 2018-11-21 | End: 2018-11-21

## 2018-11-21 RX ORDER — ATENOLOL AND CHLORTHALIDONE TABLET 50; 25 MG/1; MG/1
1 TABLET ORAL DAILY
Qty: 90 TABLET | Refills: 3 | Status: SHIPPED | OUTPATIENT
Start: 2018-11-21 | End: 2020-01-09

## 2018-11-21 RX ORDER — OMEPRAZOLE 40 MG/1
40 CAPSULE, DELAYED RELEASE ORAL DAILY
Qty: 90 CAPSULE | Refills: 3 | Status: SHIPPED | OUTPATIENT
Start: 2018-11-21 | End: 2020-01-09

## 2018-11-21 RX ORDER — ALLOPURINOL 100 MG/1
100 TABLET ORAL DAILY
Qty: 90 TABLET | Refills: 3 | Status: SHIPPED | OUTPATIENT
Start: 2018-11-21 | End: 2020-01-09

## 2018-11-21 RX ORDER — AMLODIPINE BESYLATE 10 MG/1
10 TABLET ORAL DAILY
Qty: 90 TABLET | Refills: 3 | Status: SHIPPED | OUTPATIENT
Start: 2018-11-21 | End: 2020-01-09

## 2018-11-21 RX ORDER — METFORMIN HYDROCHLORIDE 1000 MG/1
1000 TABLET ORAL 2 TIMES DAILY
Qty: 180 TABLET | Refills: 3 | Status: SHIPPED | OUTPATIENT
Start: 2018-11-21 | End: 2020-01-09

## 2018-11-21 RX ORDER — ENALAPRIL MALEATE 20 MG/1
20 TABLET ORAL 2 TIMES DAILY
Qty: 180 TABLET | Refills: 3 | Status: SHIPPED | OUTPATIENT
Start: 2018-11-21 | End: 2020-01-09

## 2018-11-21 NOTE — PROGRESS NOTES
HPI     57 Y/o here for screening for diabetic retinopathy with non-fundus photos   per Dr. Rodrigues     Last edited by Tres Pedraza MA on 11/21/2018 11:36 AM. (History)            Assessment /Plan     For exam results, see Encounter Report.    Type 2 diabetes, uncontrolled, with neuropathy  -     Diabetic Eye Screening Photo      Please see Dr. Badillo progress note for interpretation

## 2018-11-21 NOTE — PROGRESS NOTES
[facility]  Progress Note    SUBJECTIVE:     Chief Complaint   Patient presents with    Annual Exam       HPI   Connor Zuluaga  is a 58 y.o. male here for annual exam. Patient has no other new complaints/problems at this time.      Review of patient's allergies indicates:  No Known Allergies    Past Medical History:   Diagnosis Date    Diabetes mellitus, type 2     Gout     Hyperlipidemia     Hypertension      Past Surgical History:   Procedure Laterality Date    RETINAL LASER PROCEDURE Left 5 yrs    TONSILLECTOMY       Family History   Problem Relation Age of Onset    Hyperlipidemia Mother     Hypertension Mother     Diabetes Mother     Cataracts Mother     Hyperlipidemia Father     Hypertension Father     Diabetes Father     Cancer Father         prostate, skin cancer, polyps in intestine    No Known Problems Sister     No Known Problems Brother     No Known Problems Maternal Aunt     No Known Problems Maternal Uncle     No Known Problems Paternal Aunt     No Known Problems Paternal Uncle     No Known Problems Maternal Grandmother     No Known Problems Maternal Grandfather     No Known Problems Paternal Grandmother     No Known Problems Paternal Grandfather     Amblyopia Neg Hx     Blindness Neg Hx     Glaucoma Neg Hx     Macular degeneration Neg Hx     Retinal detachment Neg Hx     Strabismus Neg Hx     Stroke Neg Hx     Thyroid disease Neg Hx      Social History     Tobacco Use    Smoking status: Current Every Day Smoker     Packs/day: 1.00    Smokeless tobacco: Never Used   Substance Use Topics    Alcohol use: Yes    Drug use: No        Review of Systems   Constitutional: Negative for chills, diaphoresis and fever.   HENT: Negative for congestion, ear pain and sore throat.    Eyes: Negative for photophobia and discharge.   Respiratory: Negative for cough, shortness of breath and wheezing.    Cardiovascular: Negative for chest pain and palpitations.    Gastrointestinal: Negative for abdominal pain, constipation, diarrhea, nausea and vomiting.   Genitourinary: Negative for dysuria and hematuria.   Musculoskeletal: Negative for back pain and myalgias.   Skin: Negative for itching and rash.   Neurological: Negative for dizziness, sensory change, focal weakness, weakness and headaches.   All other systems reviewed and are negative.    OBJECTIVE:     Vitals:    11/21/18 1032   BP: 132/82   Pulse: 75   Temp: 98.6 °F (37 °C)     Body mass index is 37.13 kg/m².    Physical Exam   Constitutional: He is oriented to person, place, and time and well-developed, well-nourished, and in no distress. No distress.   HENT:   Head: Normocephalic and atraumatic.   Right Ear: Tympanic membrane is not perforated, not erythematous and not bulging. No hemotympanum.   Left Ear: Tympanic membrane is not perforated, not erythematous and not bulging. No hemotympanum.   Mouth/Throat: Oropharynx is clear and moist. No oropharyngeal exudate.   Eyes: Conjunctivae and EOM are normal. Pupils are equal, round, and reactive to light.   Neck: No thyromegaly present.   Cardiovascular: Normal rate, regular rhythm and normal heart sounds. Exam reveals no gallop and no friction rub.   No murmur heard.  Pulmonary/Chest: Effort normal and breath sounds normal. No respiratory distress. He has no wheezes. He has no rales.   Abdominal: Soft. Bowel sounds are normal. He exhibits no distension. There is no tenderness. There is no rebound and no guarding.   Musculoskeletal: Normal range of motion. He exhibits no edema or tenderness.   Lymphadenopathy:     He has no cervical adenopathy.   Neurological: He is alert and oriented to person, place, and time.   Skin: Skin is warm. No rash noted. He is not diaphoretic. No erythema.     Protective Sensation (w/ 10 gram monofilament):  Right: Intact  Left: Intact    Visual Inspection:  Normal -  Bilateral    Pedal Pulses:   Right: Present  Left: Present    Posterior  tibialis:   Right:Present  Left: Present      Old records were reviewed. Labs and/or images were independently reviewed.    ASSESSMENT     1. Routine physical examination    2. Idiopathic gout, unspecified chronicity, unspecified site    3. Essential hypertension    4. Hyperlipidemia, unspecified hyperlipidemia type    5. Type 2 diabetes, uncontrolled, with neuropathy    6. Gastroesophageal reflux disease without esophagitis    7. Need for immunization against influenza        PLAN:     Routine physical examination  -     Comprehensive metabolic panel; Future  -     CBC auto differential; Future  -     Hemoglobin A1c; Future  -     Lipid panel; Future  -     TSH; Future  -     PSA, Screening; Future; Expected date: 11/21/2018  -     T4, free; Future  -     We briefly discussed diet, exercise, and routine preventive exams. All questions and comments addressed.    Idiopathic gout, unspecified chronicity, unspecified site  -     allopurinol (ZYLOPRIM) 100 MG tablet; Take 1 tablet (100 mg total) by mouth once daily.  Dispense: 90 tablet; Refill: 3  -     Stable. Continue current regimen.    Essential hypertension  -     amLODIPine (NORVASC) 10 MG tablet; Take 1 tablet (10 mg total) by mouth once daily.  Dispense: 90 tablet; Refill: 3  -     atenolol-chlorthalidone (TENORETIC) 50-25 mg Tab; Take 1 tablet by mouth once daily.  Dispense: 90 tablet; Refill: 3  -     enalapril (VASOTEC) 20 MG tablet; Take 1 tablet (20 mg total) by mouth 2 (two) times daily.  Dispense: 180 tablet; Refill: 3  -     Stable. Continue current regimen.    Hyperlipidemia, unspecified hyperlipidemia type  -     atorvastatin (LIPITOR) 40 MG tablet; Take 1 tablet (40 mg total) by mouth once daily.  Dispense: 90 tablet; Refill: 3  -     Counseled patient about healthy diet, exercise habits, and to increase physical activity.    Type 2 diabetes, uncontrolled, with neuropathy  -     insulin glargine (LANTUS U-100 INSULIN) 100 unit/mL injection; INJECT 85  UNITS UNDER THE SKIN AT BEDTIME  Dispense: 30 mL; Refill: 3  -     metFORMIN (GLUCOPHAGE) 1000 MG tablet; Take 1 tablet (1,000 mg total) by mouth 2 (two) times daily.  Dispense: 180 tablet; Refill: 3  -     Diabetic Eye Screening Photo; Future  -     insulin lispro (HUMALOG KWIKPEN INSULIN) 200 unit/mL (3 mL) InPn; Inject 35 Units into the skin 3 (three) times daily.  Dispense: 15 mL; Refill: 3  -     Stable. Continue current regimen.    Gastroesophageal reflux disease without esophagitis  -     omeprazole (PRILOSEC) 40 MG capsule; Take 1 capsule (40 mg total) by mouth once daily.  Dispense: 90 capsule; Refill: 3    Need for immunization against influenza  -     Influenza - Quadrivalent (3 years & older) (PF)      RTC PRADRIEL Rodrigues MD  11/21/2018 10:51 AM

## 2018-11-23 ENCOUNTER — TELEPHONE (OUTPATIENT)
Dept: OPHTHALMOLOGY | Facility: CLINIC | Age: 58
End: 2018-11-23

## 2018-11-23 NOTE — TELEPHONE ENCOUNTER
Called patient left voicemail regarding diabetic eye screening results.      ----- Message from Tres Pedraza MA sent at 11/23/2018  8:54 AM CST -----      ----- Message -----  From: Grisel Cuba  Sent: 11/21/2018   4:31 PM  To: Tres Pedraza MA        ----- Message -----  From: Yifan Badillo MD  Sent: 11/21/2018  12:15 PM  To: Priyanka Metz

## 2018-11-26 ENCOUNTER — TELEPHONE (OUTPATIENT)
Dept: OPHTHALMOLOGY | Facility: CLINIC | Age: 58
End: 2018-11-26

## 2018-11-26 RX ORDER — PEN NEEDLE, DIABETIC 30 GX3/16"
1 NEEDLE, DISPOSABLE MISCELLANEOUS 3 TIMES DAILY
Qty: 200 EACH | Refills: 3 | Status: SHIPPED | OUTPATIENT
Start: 2018-11-26 | End: 2020-06-25 | Stop reason: SDUPTHER

## 2018-11-26 NOTE — TELEPHONE ENCOUNTER
Patient called in gave him the diabetic eye screening results ; also schedule patient an appointment.      ----- Message from Tres Pedraza MA sent at 11/23/2018  8:54 AM CST -----      ----- Message -----  From: Grisel Cuba  Sent: 11/21/2018   4:31 PM  To: Tres Pedraza MA        ----- Message -----  From: Yifan Badillo MD  Sent: 11/21/2018  12:15 PM  To: Priyanka Metz

## 2019-04-16 RX ORDER — INSULIN LISPRO 200 [IU]/ML
INJECTION, SOLUTION SUBCUTANEOUS
Qty: 18 ML | Refills: 2 | Status: SHIPPED | OUTPATIENT
Start: 2019-04-16 | End: 2019-11-07 | Stop reason: SDUPTHER

## 2019-04-16 RX ORDER — INSULIN GLARGINE 100 [IU]/ML
INJECTION, SOLUTION SUBCUTANEOUS
Qty: 30 ML | Refills: 3 | Status: SHIPPED | OUTPATIENT
Start: 2019-04-16 | End: 2019-08-12 | Stop reason: SDUPTHER

## 2019-04-17 ENCOUNTER — OFFICE VISIT (OUTPATIENT)
Dept: OPTOMETRY | Facility: CLINIC | Age: 59
End: 2019-04-17
Payer: COMMERCIAL

## 2019-04-17 DIAGNOSIS — H52.7 REFRACTIVE ERROR: ICD-10-CM

## 2019-04-17 DIAGNOSIS — E11.3293 MILD NONPROLIFERATIVE DIABETIC RETINOPATHY OF BOTH EYES WITHOUT MACULAR EDEMA ASSOCIATED WITH TYPE 2 DIABETES MELLITUS: Primary | ICD-10-CM

## 2019-04-17 LAB
LEFT EYE DM RETINOPATHY: POSITIVE
RIGHT EYE DM RETINOPATHY: POSITIVE

## 2019-04-17 PROCEDURE — 99999 PR PBB SHADOW E&M-EST. PATIENT-LVL II: ICD-10-PCS | Mod: PBBFAC,,, | Performed by: OPTOMETRIST

## 2019-04-17 PROCEDURE — 92014 PR EYE EXAM, EST PATIENT,COMPREHESV: ICD-10-PCS | Mod: S$GLB,,, | Performed by: OPTOMETRIST

## 2019-04-17 PROCEDURE — 92014 COMPRE OPH EXAM EST PT 1/>: CPT | Mod: S$GLB,,, | Performed by: OPTOMETRIST

## 2019-04-17 PROCEDURE — 92015 PR REFRACTION: ICD-10-PCS | Mod: S$GLB,,, | Performed by: OPTOMETRIST

## 2019-04-17 PROCEDURE — 92015 DETERMINE REFRACTIVE STATE: CPT | Mod: S$GLB,,, | Performed by: OPTOMETRIST

## 2019-04-17 PROCEDURE — 99999 PR PBB SHADOW E&M-EST. PATIENT-LVL II: CPT | Mod: PBBFAC,,, | Performed by: OPTOMETRIST

## 2019-04-17 NOTE — PROGRESS NOTES
Subjective:       Patient ID: Connor Zuluaga is a 58 y.o. male      Chief Complaint   Patient presents with    Diabetic Eye Exam     History of Present Illness  DLS 2/17/17 Dr. Christie    Patient here for diabetic eye exam. Patient would like refraction. Patient denies any ocular complaints.    LBS unknown    Hemoglobin A1C       Date                     Value               Ref Range           Status             11/21/2017               6.6 (H)             4.0 - 5.6 %         Final              02/10/2017               9.5 (H)             4.5 - 6.2 %         Final              05/20/2016               8.8 (H)             4.5 - 6.2 %         Final            Eye meds: none    S/p retinal laser procedure os (2014)        Assessment/Plan:     1. Mild nonproliferative diabetic retinopathy of both eyes without macular edema associated with type 2 diabetes mellitus  No ocular treatment needed at this time. Educated patient about effects of diabetes on vision. Emphasized importance of good blood sugar control, compliance with medications, and follow up care with PCP. Return in 1 years for DFE OU, sooner PRN.    2. Refractive error  Educated patient on refractive error and discussed lens options. Dispensed updated spectacle Rx. Educated about adaptation period to new specs.    Eyeglass Final Rx     Eyeglass Final Rx       Sphere Cylinder Axis Add    Right -5.25 +0.75 130 +2.25    Left -4.00 +1.25 035 +2.25    Expiration Date:  4/17/2020                  Follow up in about 1 year (around 4/17/2020) for Diabetic Eye Exam.

## 2019-08-12 RX ORDER — INSULIN GLARGINE 100 [IU]/ML
INJECTION, SOLUTION SUBCUTANEOUS
Qty: 30 ML | Refills: 3 | Status: SHIPPED | OUTPATIENT
Start: 2019-08-12 | End: 2019-12-07 | Stop reason: SDUPTHER

## 2019-11-07 RX ORDER — INSULIN LISPRO 200 [IU]/ML
INJECTION, SOLUTION SUBCUTANEOUS
Qty: 18 ML | Refills: 2 | Status: SHIPPED | OUTPATIENT
Start: 2019-11-07 | End: 2020-01-13

## 2019-12-09 RX ORDER — INSULIN GLARGINE 100 [IU]/ML
INJECTION, SOLUTION SUBCUTANEOUS
Qty: 30 ML | Refills: 3 | Status: SHIPPED | OUTPATIENT
Start: 2019-12-09 | End: 2020-04-22

## 2020-01-06 DIAGNOSIS — E11.3293 MILD NONPROLIFERATIVE DIABETIC RETINOPATHY OF BOTH EYES WITHOUT MACULAR EDEMA ASSOCIATED WITH TYPE 2 DIABETES MELLITUS: ICD-10-CM

## 2020-01-09 DIAGNOSIS — K21.9 GASTROESOPHAGEAL REFLUX DISEASE WITHOUT ESOPHAGITIS: ICD-10-CM

## 2020-01-09 DIAGNOSIS — M10.00 IDIOPATHIC GOUT, UNSPECIFIED CHRONICITY, UNSPECIFIED SITE: ICD-10-CM

## 2020-01-09 DIAGNOSIS — I10 ESSENTIAL HYPERTENSION: ICD-10-CM

## 2020-01-09 DIAGNOSIS — E78.5 HYPERLIPIDEMIA, UNSPECIFIED HYPERLIPIDEMIA TYPE: ICD-10-CM

## 2020-01-09 RX ORDER — ATORVASTATIN CALCIUM 40 MG/1
TABLET, FILM COATED ORAL
Qty: 90 TABLET | Refills: 0 | Status: SHIPPED | OUTPATIENT
Start: 2020-01-09 | End: 2020-04-09

## 2020-01-09 RX ORDER — ENALAPRIL MALEATE 20 MG/1
20 TABLET ORAL 2 TIMES DAILY
Qty: 180 TABLET | Refills: 0 | Status: SHIPPED | OUTPATIENT
Start: 2020-01-09 | End: 2020-04-09

## 2020-01-09 RX ORDER — OMEPRAZOLE 40 MG/1
CAPSULE, DELAYED RELEASE ORAL
Qty: 90 CAPSULE | Refills: 0 | Status: SHIPPED | OUTPATIENT
Start: 2020-01-09 | End: 2020-04-09

## 2020-01-09 RX ORDER — AMLODIPINE BESYLATE 10 MG/1
10 TABLET ORAL DAILY
Qty: 90 TABLET | Refills: 0 | Status: SHIPPED | OUTPATIENT
Start: 2020-01-09 | End: 2020-04-12

## 2020-01-09 RX ORDER — METFORMIN HYDROCHLORIDE 1000 MG/1
1000 TABLET ORAL 2 TIMES DAILY
Qty: 180 TABLET | Refills: 0 | Status: SHIPPED | OUTPATIENT
Start: 2020-01-09 | End: 2020-04-09

## 2020-01-09 RX ORDER — ALLOPURINOL 100 MG/1
100 TABLET ORAL DAILY
Qty: 90 TABLET | Refills: 0 | Status: SHIPPED | OUTPATIENT
Start: 2020-01-09 | End: 2020-04-09

## 2020-01-09 RX ORDER — ATENOLOL AND CHLORTHALIDONE TABLET 50; 25 MG/1; MG/1
1 TABLET ORAL DAILY
Qty: 90 TABLET | Refills: 0 | Status: SHIPPED | OUTPATIENT
Start: 2020-01-09 | End: 2020-04-09

## 2020-01-13 ENCOUNTER — TELEPHONE (OUTPATIENT)
Dept: FAMILY MEDICINE | Facility: CLINIC | Age: 60
End: 2020-01-13

## 2020-01-13 RX ORDER — INSULIN ASPART 100 [IU]/ML
28 INJECTION, SOLUTION INTRAVENOUS; SUBCUTANEOUS
Qty: 15 ML | Refills: 1 | Status: SHIPPED | OUTPATIENT
Start: 2020-01-13 | End: 2020-01-22 | Stop reason: SDUPTHER

## 2020-01-13 NOTE — TELEPHONE ENCOUNTER
insulin aspart U-100 (NOVOLOG FLEXPEN U-100 INSULIN) 100 unit/mL (3 mL) InPn pen    Spoke with patient and informed of recommendations and patient verbalized understandings.

## 2020-01-13 NOTE — TELEPHONE ENCOUNTER
----- Message from Ayse Khan sent at 1/13/2020 12:51 PM CST -----  Contact: Pt  Pt called to speak to the nurse regarding changing his appt due to the insurance no longer paying for HUMALOG KWIKPEN INSULIN 200 unit/mL (3 mL) In and would like a call back today at [phone]

## 2020-01-13 NOTE — TELEPHONE ENCOUNTER
Spoke with the patient and his insurance company is not covering the Humalog any longer.  Pharmacy states the two perferred insulins are: Novolog or Novolin.  Patient verbalized understandings.  Please advise

## 2020-01-20 ENCOUNTER — PATIENT MESSAGE (OUTPATIENT)
Dept: FAMILY MEDICINE | Facility: CLINIC | Age: 60
End: 2020-01-20

## 2020-01-21 NOTE — TELEPHONE ENCOUNTER
Spoke with the pt and the new Rx for Novalog was written for 28 units not 35 units.  Pt is requesting enough medication to last all month.  Please clarify the correct dose age?

## 2020-01-22 ENCOUNTER — PATIENT MESSAGE (OUTPATIENT)
Dept: FAMILY MEDICINE | Facility: CLINIC | Age: 60
End: 2020-01-22

## 2020-01-22 RX ORDER — INSULIN ASPART 100 [IU]/ML
28 INJECTION, SOLUTION INTRAVENOUS; SUBCUTANEOUS
Qty: 30 ML | Refills: 1 | Status: SHIPPED | OUTPATIENT
Start: 2020-01-22 | End: 2020-05-25

## 2020-04-06 DIAGNOSIS — I10 ESSENTIAL HYPERTENSION: ICD-10-CM

## 2020-04-06 DIAGNOSIS — K21.9 GASTROESOPHAGEAL REFLUX DISEASE WITHOUT ESOPHAGITIS: ICD-10-CM

## 2020-04-06 DIAGNOSIS — E78.5 HYPERLIPIDEMIA, UNSPECIFIED HYPERLIPIDEMIA TYPE: ICD-10-CM

## 2020-04-06 DIAGNOSIS — M10.00 IDIOPATHIC GOUT, UNSPECIFIED CHRONICITY, UNSPECIFIED SITE: ICD-10-CM

## 2020-04-09 RX ORDER — ATENOLOL AND CHLORTHALIDONE TABLET 50; 25 MG/1; MG/1
1 TABLET ORAL DAILY
Qty: 90 TABLET | Refills: 0 | Status: SHIPPED | OUTPATIENT
Start: 2020-04-09 | End: 2020-06-10 | Stop reason: SDUPTHER

## 2020-04-09 RX ORDER — ENALAPRIL MALEATE 20 MG/1
20 TABLET ORAL 2 TIMES DAILY
Qty: 180 TABLET | Refills: 0 | Status: SHIPPED | OUTPATIENT
Start: 2020-04-09 | End: 2020-06-10 | Stop reason: SDUPTHER

## 2020-04-09 RX ORDER — OMEPRAZOLE 40 MG/1
CAPSULE, DELAYED RELEASE ORAL
Qty: 90 CAPSULE | Refills: 0 | Status: SHIPPED | OUTPATIENT
Start: 2020-04-09 | End: 2020-06-10 | Stop reason: SDUPTHER

## 2020-04-09 RX ORDER — ALLOPURINOL 100 MG/1
100 TABLET ORAL DAILY
Qty: 90 TABLET | Refills: 0 | Status: SHIPPED | OUTPATIENT
Start: 2020-04-09 | End: 2020-06-10 | Stop reason: SDUPTHER

## 2020-04-09 RX ORDER — METFORMIN HYDROCHLORIDE 1000 MG/1
1000 TABLET ORAL 2 TIMES DAILY
Qty: 180 TABLET | Refills: 0 | Status: SHIPPED | OUTPATIENT
Start: 2020-04-09 | End: 2020-07-06

## 2020-04-09 RX ORDER — ATORVASTATIN CALCIUM 40 MG/1
TABLET, FILM COATED ORAL
Qty: 90 TABLET | Refills: 0 | Status: SHIPPED | OUTPATIENT
Start: 2020-04-09 | End: 2020-06-10 | Stop reason: SDUPTHER

## 2020-04-10 DIAGNOSIS — I10 ESSENTIAL HYPERTENSION: ICD-10-CM

## 2020-04-12 RX ORDER — AMLODIPINE BESYLATE 10 MG/1
10 TABLET ORAL DAILY
Qty: 90 TABLET | Refills: 0 | Status: SHIPPED | OUTPATIENT
Start: 2020-04-12 | End: 2020-06-10 | Stop reason: SDUPTHER

## 2020-04-22 RX ORDER — INSULIN GLARGINE 100 [IU]/ML
INJECTION, SOLUTION SUBCUTANEOUS
Qty: 30 ML | Refills: 0 | Status: SHIPPED | OUTPATIENT
Start: 2020-04-22 | End: 2020-05-27

## 2020-05-12 ENCOUNTER — PATIENT MESSAGE (OUTPATIENT)
Dept: ADMINISTRATIVE | Facility: HOSPITAL | Age: 60
End: 2020-05-12

## 2020-05-25 RX ORDER — INSULIN ASPART 100 [IU]/ML
INJECTION, SOLUTION INTRAVENOUS; SUBCUTANEOUS
Qty: 5 BOX | Refills: 0 | Status: SHIPPED | OUTPATIENT
Start: 2020-05-25 | End: 2020-06-10 | Stop reason: SDUPTHER

## 2020-05-25 NOTE — PROGRESS NOTES
Refill Routing Note    Medication(s) are not appropriate for processing by [facility]:       Patient has not been seen in over 15 months by PCP and Required laboratory values are outdated >6 months     Medication-related problems identified:   Requires labs  Requires appointment  Medication Therapy Plan: NTBO (A1C/Lipid/CBC/Uric)/NTBS (A1C/Lipid/CBC/Uric/CMP); Needs Appt (Annual); unclear if pt still follow pcp; defer to you  Medication reconciliation completed: No      Automatic Epic Protocol Generated Data:    Requested Prescriptions   Pending Prescriptions Disp Refills    NOVOLOG FLEXPEN U-100 INSULIN 100 unit/mL (3 mL) InPn pen [Pharmacy Med Name: Novolog Flexpen U-100 Insulin aspart 100 unit/mL (3 mL) subcutaneous] 5 Box 0     Sig: Inject 28 Units into the skin 3 (three) times daily before meals.       Endocrinology:  Diabetes - Insulins Failed - 5/22/2020  9:17 AM        Failed - Office visit in past 12 months or future 90 days.     Recent Outpatient Visits            1 year ago Mild nonproliferative diabetic retinopathy of both eyes without macular edema associated with type 2 diabetes mellitus    [facility] Yvonne Vo, OD    1 year ago Routine physical examination    [facility] Zaire Rodrigues MD    2 years ago Routine physical examination    [facility] Zaire Rodrigues MD    3 years ago Type 2 diabetes mellitus without ophthalmic manifestations    [facility] Yvonne Vo, OD    3 years ago Type 2 diabetes, uncontrolled, with neuropathy    [facility] Zaire Rodrigues MD                    Failed - HBA1C is 7.9 or below and within 180 days     Hemoglobin A1C   Date Value Ref Range Status   11/21/2017 6.6 (H) 4.0 - 5.6 % Final     Comment:     According to ADA guidelines, hemoglobin A1c <7.0% represents  optimal control in non-pregnant diabetic patients. Different  metrics may apply to specific patient populations.   Standards of Medical Care  in Diabetes-2016.  For the purpose of screening for the presence of diabetes:  <5.7%     Consistent with the absence of diabetes  5.7-6.4%  Consistent with increasing risk for diabetes   (prediabetes)  >or=6.5%  Consistent with diabetes  Currently, no consensus exists for use of hemoglobin A1c  for diagnosis of diabetes for children.  This Hemoglobin A1c assay has significant interference with fetal   hemoglobin   (HbF). The results are invalid for patients with abnormal amounts of   HbF,   including those with known Hereditary Persistence   of Fetal Hemoglobin. Heterozygous hemoglobin variants (HbAS, HbAC,   HbAD, HbAE, HbA2) do not significantly interfere with this assay;   however, presence of multiple variants in a sample may impact the %   interference.     02/10/2017 9.5 (H) 4.5 - 6.2 % Final     Comment:     According to ADA guidelines, hemoglobin A1C <7.0% represents  optimal control in non-pregnant diabetic patients.  Different  metrics may apply to specific populations.   Standards of Medical Care in Diabetes - 2016.  For the purpose of screening for the presence of diabetes:  <5.7%     Consistent with the absence of diabetes  5.7-6.4%  Consistent with increasing risk for diabetes   (prediabetes)  >or=6.5%  Consistent with diabetes  Currently no consensus exists for use of hemoglobin A1C  for diagnosis of diabetes for children.     05/20/2016 8.8 (H) 4.5 - 6.2 % Final              Failed - eGFR is 30 or above and within 360 days     eGFR if non    Date Value Ref Range Status   11/21/2017 >60.0 >60 mL/min/1.73 m^2 Final     Comment:     Calculation used to obtain the estimated glomerular filtration  rate (eGFR) is the CKD-EPI equation.      05/20/2016 >60.0 >60 mL/min/1.73 m^2 Final     Comment:     Calculation used to obtain the estimated glomerular filtration  rate (eGFR) is the CKD-EPI equation. Since race is unknown   in our information system, the eGFR values for   -American and  Non--American patients are given   for each creatinine result.       eGFR if    Date Value Ref Range Status   11/21/2017 >60.0 >60 mL/min/1.73 m^2 Final   05/20/2016 >60.0 >60 mL/min/1.73 m^2 Final              Failed - Cr is 1.3 or below and within 360 days     Creatinine   Date Value Ref Range Status   11/21/2017 0.9 0.5 - 1.4 mg/dL Final   05/20/2016 0.9 0.5 - 1.4 mg/dL Final              Passed - Patient is at least 18 years old           Appointments  past 12m or future 3m with PCP    Date Provider   Last Visit   11/21/2018 Zaire Rodrigues MD   Next Visit   Visit date not found Zaire Rodrigues MD   ED visits in past 90 days: 0     Note composed:10:16 AM 05/25/2020

## 2020-05-27 ENCOUNTER — PATIENT OUTREACH (OUTPATIENT)
Dept: ADMINISTRATIVE | Facility: HOSPITAL | Age: 60
End: 2020-05-27

## 2020-05-27 DIAGNOSIS — E11.3293 MILD NONPROLIFERATIVE DIABETIC RETINOPATHY OF BOTH EYES WITHOUT MACULAR EDEMA ASSOCIATED WITH TYPE 2 DIABETES MELLITUS: Primary | ICD-10-CM

## 2020-05-27 RX ORDER — INSULIN GLARGINE 100 [IU]/ML
INJECTION, SOLUTION SUBCUTANEOUS
Qty: 30 ML | Refills: 0 | Status: SHIPPED | OUTPATIENT
Start: 2020-05-27 | End: 2020-06-10 | Stop reason: SDUPTHER

## 2020-05-29 ENCOUNTER — LAB VISIT (OUTPATIENT)
Dept: LAB | Facility: HOSPITAL | Age: 60
End: 2020-05-29
Attending: INTERNAL MEDICINE
Payer: COMMERCIAL

## 2020-05-29 LAB
ALBUMIN SERPL BCP-MCNC: 4.4 G/DL (ref 3.5–5.2)
ALBUMIN/CREAT UR: 555 UG/MG (ref 0–30)
ALP SERPL-CCNC: 65 U/L (ref 55–135)
ALT SERPL W/O P-5'-P-CCNC: 32 U/L (ref 10–44)
ANION GAP SERPL CALC-SCNC: 15 MMOL/L (ref 8–16)
AST SERPL-CCNC: 22 U/L (ref 10–40)
BASOPHILS # BLD AUTO: 0.1 K/UL (ref 0–0.2)
BASOPHILS NFR BLD: 1 % (ref 0–1.9)
BILIRUB SERPL-MCNC: 0.9 MG/DL (ref 0.1–1)
BUN SERPL-MCNC: 17 MG/DL (ref 6–20)
CALCIUM SERPL-MCNC: 9.6 MG/DL (ref 8.7–10.5)
CHLORIDE SERPL-SCNC: 96 MMOL/L (ref 95–110)
CHOLEST SERPL-MCNC: 159 MG/DL (ref 120–199)
CHOLEST/HDLC SERPL: 4.1 {RATIO} (ref 2–5)
CO2 SERPL-SCNC: 25 MMOL/L (ref 23–29)
CREAT SERPL-MCNC: 1.1 MG/DL (ref 0.5–1.4)
CREAT UR-MCNC: 109 MG/DL (ref 23–375)
DIFFERENTIAL METHOD: ABNORMAL
EOSINOPHIL # BLD AUTO: 0.2 K/UL (ref 0–0.5)
EOSINOPHIL NFR BLD: 2 % (ref 0–8)
ERYTHROCYTE [DISTWIDTH] IN BLOOD BY AUTOMATED COUNT: 12.9 % (ref 11.5–14.5)
EST. GFR  (AFRICAN AMERICAN): >60 ML/MIN/1.73 M^2
EST. GFR  (NON AFRICAN AMERICAN): >60 ML/MIN/1.73 M^2
ESTIMATED AVG GLUCOSE: 200 MG/DL (ref 68–131)
GLUCOSE SERPL-MCNC: 223 MG/DL (ref 70–110)
HBA1C MFR BLD HPLC: 8.6 % (ref 4–5.6)
HCT VFR BLD AUTO: 49.7 % (ref 40–54)
HDLC SERPL-MCNC: 39 MG/DL (ref 40–75)
HDLC SERPL: 24.5 % (ref 20–50)
HGB BLD-MCNC: 15.9 G/DL (ref 14–18)
IMM GRANULOCYTES # BLD AUTO: 0.09 K/UL (ref 0–0.04)
IMM GRANULOCYTES NFR BLD AUTO: 0.9 % (ref 0–0.5)
LDLC SERPL CALC-MCNC: 96.4 MG/DL (ref 63–159)
LYMPHOCYTES # BLD AUTO: 2.4 K/UL (ref 1–4.8)
LYMPHOCYTES NFR BLD: 23.5 % (ref 18–48)
MCH RBC QN AUTO: 30.6 PG (ref 27–31)
MCHC RBC AUTO-ENTMCNC: 32 G/DL (ref 32–36)
MCV RBC AUTO: 96 FL (ref 82–98)
MICROALBUMIN UR DL<=1MG/L-MCNC: 605 UG/ML
MONOCYTES # BLD AUTO: 0.6 K/UL (ref 0.3–1)
MONOCYTES NFR BLD: 5.9 % (ref 4–15)
NEUTROPHILS # BLD AUTO: 6.9 K/UL (ref 1.8–7.7)
NEUTROPHILS NFR BLD: 66.7 % (ref 38–73)
NONHDLC SERPL-MCNC: 120 MG/DL
NRBC BLD-RTO: 0 /100 WBC
PLATELET # BLD AUTO: 226 K/UL (ref 150–350)
PMV BLD AUTO: 10.3 FL (ref 9.2–12.9)
POTASSIUM SERPL-SCNC: 4.2 MMOL/L (ref 3.5–5.1)
PROT SERPL-MCNC: 7.8 G/DL (ref 6–8.4)
RBC # BLD AUTO: 5.2 M/UL (ref 4.6–6.2)
SODIUM SERPL-SCNC: 136 MMOL/L (ref 136–145)
TRIGL SERPL-MCNC: 118 MG/DL (ref 30–150)
WBC # BLD AUTO: 10.31 K/UL (ref 3.9–12.7)

## 2020-05-29 PROCEDURE — 83036 HEMOGLOBIN GLYCOSYLATED A1C: CPT

## 2020-05-29 PROCEDURE — 80061 LIPID PANEL: CPT

## 2020-05-29 PROCEDURE — 82043 UR ALBUMIN QUANTITATIVE: CPT

## 2020-05-29 PROCEDURE — 85025 COMPLETE CBC W/AUTO DIFF WBC: CPT

## 2020-05-29 PROCEDURE — 36415 COLL VENOUS BLD VENIPUNCTURE: CPT | Mod: PO

## 2020-05-29 PROCEDURE — 80053 COMPREHEN METABOLIC PANEL: CPT

## 2020-06-10 ENCOUNTER — OFFICE VISIT (OUTPATIENT)
Dept: FAMILY MEDICINE | Facility: CLINIC | Age: 60
End: 2020-06-10
Payer: COMMERCIAL

## 2020-06-10 VITALS
BODY MASS INDEX: 37.18 KG/M2 | OXYGEN SATURATION: 97 % | TEMPERATURE: 97 F | DIASTOLIC BLOOD PRESSURE: 64 MMHG | WEIGHT: 265.56 LBS | HEART RATE: 77 BPM | SYSTOLIC BLOOD PRESSURE: 122 MMHG | HEIGHT: 71 IN

## 2020-06-10 DIAGNOSIS — K21.9 GASTROESOPHAGEAL REFLUX DISEASE WITHOUT ESOPHAGITIS: ICD-10-CM

## 2020-06-10 DIAGNOSIS — L21.9 SEBORRHEIC DERMATITIS: ICD-10-CM

## 2020-06-10 DIAGNOSIS — Z00.00 ROUTINE PHYSICAL EXAMINATION: Primary | ICD-10-CM

## 2020-06-10 DIAGNOSIS — I10 ESSENTIAL HYPERTENSION: ICD-10-CM

## 2020-06-10 DIAGNOSIS — Z23 NEED FOR TDAP VACCINATION: ICD-10-CM

## 2020-06-10 DIAGNOSIS — E78.5 HYPERLIPIDEMIA, UNSPECIFIED HYPERLIPIDEMIA TYPE: ICD-10-CM

## 2020-06-10 DIAGNOSIS — M10.00 IDIOPATHIC GOUT, UNSPECIFIED CHRONICITY, UNSPECIFIED SITE: ICD-10-CM

## 2020-06-10 PROCEDURE — 3074F SYST BP LT 130 MM HG: CPT | Mod: CPTII,S$GLB,, | Performed by: FAMILY MEDICINE

## 2020-06-10 PROCEDURE — 99999 PR PBB SHADOW E&M-EST. PATIENT-LVL III: CPT | Mod: PBBFAC,,, | Performed by: FAMILY MEDICINE

## 2020-06-10 PROCEDURE — 99396 PR PREVENTIVE VISIT,EST,40-64: ICD-10-PCS | Mod: 25,S$GLB,, | Performed by: FAMILY MEDICINE

## 2020-06-10 PROCEDURE — 99999 PR PBB SHADOW E&M-EST. PATIENT-LVL III: ICD-10-PCS | Mod: PBBFAC,,, | Performed by: FAMILY MEDICINE

## 2020-06-10 PROCEDURE — 90715 TDAP VACCINE 7 YRS/> IM: CPT | Mod: S$GLB,,, | Performed by: FAMILY MEDICINE

## 2020-06-10 PROCEDURE — 99396 PREV VISIT EST AGE 40-64: CPT | Mod: 25,S$GLB,, | Performed by: FAMILY MEDICINE

## 2020-06-10 PROCEDURE — 3078F PR MOST RECENT DIASTOLIC BLOOD PRESSURE < 80 MM HG: ICD-10-PCS | Mod: CPTII,S$GLB,, | Performed by: FAMILY MEDICINE

## 2020-06-10 PROCEDURE — 3074F PR MOST RECENT SYSTOLIC BLOOD PRESSURE < 130 MM HG: ICD-10-PCS | Mod: CPTII,S$GLB,, | Performed by: FAMILY MEDICINE

## 2020-06-10 PROCEDURE — 90715 TDAP VACCINE GREATER THAN OR EQUAL TO 7YO IM: ICD-10-PCS | Mod: S$GLB,,, | Performed by: FAMILY MEDICINE

## 2020-06-10 PROCEDURE — 3052F PR MOST RECENT HEMOGLOBIN A1C LEVEL 8.0 - < 9.0%: ICD-10-PCS | Mod: CPTII,S$GLB,, | Performed by: FAMILY MEDICINE

## 2020-06-10 PROCEDURE — 3052F HG A1C>EQUAL 8.0%<EQUAL 9.0%: CPT | Mod: CPTII,S$GLB,, | Performed by: FAMILY MEDICINE

## 2020-06-10 PROCEDURE — 3008F BODY MASS INDEX DOCD: CPT | Mod: CPTII,S$GLB,, | Performed by: FAMILY MEDICINE

## 2020-06-10 PROCEDURE — 90471 TDAP VACCINE GREATER THAN OR EQUAL TO 7YO IM: ICD-10-PCS | Mod: S$GLB,,, | Performed by: FAMILY MEDICINE

## 2020-06-10 PROCEDURE — 99214 OFFICE O/P EST MOD 30 MIN: CPT | Mod: 25,S$GLB,, | Performed by: FAMILY MEDICINE

## 2020-06-10 PROCEDURE — 99214 PR OFFICE/OUTPT VISIT, EST, LEVL IV, 30-39 MIN: ICD-10-PCS | Mod: 25,S$GLB,, | Performed by: FAMILY MEDICINE

## 2020-06-10 PROCEDURE — 3008F PR BODY MASS INDEX (BMI) DOCUMENTED: ICD-10-PCS | Mod: CPTII,S$GLB,, | Performed by: FAMILY MEDICINE

## 2020-06-10 PROCEDURE — 3078F DIAST BP <80 MM HG: CPT | Mod: CPTII,S$GLB,, | Performed by: FAMILY MEDICINE

## 2020-06-10 PROCEDURE — 90471 IMMUNIZATION ADMIN: CPT | Mod: S$GLB,,, | Performed by: FAMILY MEDICINE

## 2020-06-10 RX ORDER — AMLODIPINE BESYLATE 10 MG/1
10 TABLET ORAL DAILY
Qty: 90 TABLET | Refills: 0 | Status: SHIPPED | OUTPATIENT
Start: 2020-06-10 | End: 2020-10-22

## 2020-06-10 RX ORDER — INSULIN ASPART 100 [IU]/ML
INJECTION, SOLUTION INTRAVENOUS; SUBCUTANEOUS
Qty: 5 BOX | Refills: 3 | Status: SHIPPED | OUTPATIENT
Start: 2020-06-10 | End: 2020-06-12 | Stop reason: SDUPTHER

## 2020-06-10 RX ORDER — INSULIN GLARGINE 100 [IU]/ML
INJECTION, SOLUTION SUBCUTANEOUS
Qty: 30 ML | Refills: 0 | Status: SHIPPED | OUTPATIENT
Start: 2020-06-10 | End: 2020-06-12 | Stop reason: SDUPTHER

## 2020-06-10 RX ORDER — ALLOPURINOL 100 MG/1
100 TABLET ORAL DAILY
Qty: 90 TABLET | Refills: 0 | Status: SHIPPED | OUTPATIENT
Start: 2020-06-10 | End: 2020-10-22

## 2020-06-10 RX ORDER — ATORVASTATIN CALCIUM 40 MG/1
TABLET, FILM COATED ORAL
Qty: 90 TABLET | Refills: 3 | Status: SHIPPED | OUTPATIENT
Start: 2020-06-10 | End: 2021-05-14

## 2020-06-10 RX ORDER — ENALAPRIL MALEATE 20 MG/1
20 TABLET ORAL 2 TIMES DAILY
Qty: 180 TABLET | Refills: 3 | Status: SHIPPED | OUTPATIENT
Start: 2020-06-10 | End: 2021-05-14

## 2020-06-10 RX ORDER — OMEPRAZOLE 40 MG/1
CAPSULE, DELAYED RELEASE ORAL
Qty: 90 CAPSULE | Refills: 0 | Status: SHIPPED | OUTPATIENT
Start: 2020-06-10 | End: 2020-10-22

## 2020-06-10 RX ORDER — ATENOLOL AND CHLORTHALIDONE TABLET 50; 25 MG/1; MG/1
1 TABLET ORAL DAILY
Qty: 90 TABLET | Refills: 3 | Status: SHIPPED | OUTPATIENT
Start: 2020-06-10 | End: 2021-05-14

## 2020-06-10 NOTE — PROGRESS NOTES
[facility]  Progress Note    SUBJECTIVE:     Chief Complaint   Patient presents with    Follow-up       HPI   Connor Zuluaga  is a 59 y.o. male here for routine physical exam. Also here for follow-up of his chronic conditions, and has skin issues which will be addressed below. Patient has no other new complaints/problems at this time.      Review of patient's allergies indicates:  No Known Allergies    Past Medical History:   Diagnosis Date    Diabetes mellitus, type 2     Gout     Hyperlipidemia     Hypertension     Mild nonproliferative diabetic retinopathy of both eyes without macular edema associated with type 2 diabetes mellitus 11/21/2018     Past Surgical History:   Procedure Laterality Date    RETINAL LASER PROCEDURE Left 5 yrs    TONSILLECTOMY       Family History   Problem Relation Age of Onset    Hyperlipidemia Mother     Hypertension Mother     Diabetes Mother     Cataracts Mother     Hyperlipidemia Father     Hypertension Father     Diabetes Father     Cancer Father         prostate, skin cancer, polyps in intestine    No Known Problems Sister     No Known Problems Brother     No Known Problems Maternal Aunt     No Known Problems Maternal Uncle     No Known Problems Paternal Aunt     No Known Problems Paternal Uncle     No Known Problems Maternal Grandmother     No Known Problems Maternal Grandfather     No Known Problems Paternal Grandmother     No Known Problems Paternal Grandfather     Amblyopia Neg Hx     Blindness Neg Hx     Glaucoma Neg Hx     Macular degeneration Neg Hx     Retinal detachment Neg Hx     Strabismus Neg Hx     Stroke Neg Hx     Thyroid disease Neg Hx      Social History     Tobacco Use    Smoking status: Current Every Day Smoker     Packs/day: 1.00    Smokeless tobacco: Never Used   Substance Use Topics    Alcohol use: Yes    Drug use: No        Review of Systems   Constitutional: Negative for chills, diaphoresis and fever.   HENT:  Negative for congestion, ear pain and sore throat.    Eyes: Negative for photophobia and discharge.   Respiratory: Negative for cough, shortness of breath and wheezing.    Cardiovascular: Negative for chest pain and palpitations.   Gastrointestinal: Negative for abdominal pain, constipation, diarrhea, nausea and vomiting.   Genitourinary: Negative for dysuria and hematuria.   Musculoskeletal: Negative for back pain and myalgias.   Skin: Negative for itching and rash.   Neurological: Negative for dizziness, sensory change, focal weakness, weakness and headaches.   All other systems reviewed and are negative.    OBJECTIVE:     Vitals:    06/10/20 1325   BP: 122/64   Pulse: 77   Temp: 97.2 °F (36.2 °C)     Body mass index is 37.04 kg/m².    Physical Exam   Constitutional: He is oriented to person, place, and time and well-developed, well-nourished, and in no distress. No distress.   HENT:   Head: Normocephalic and atraumatic.   Right Ear: Tympanic membrane is not perforated, not erythematous and not bulging. No hemotympanum.   Left Ear: Tympanic membrane is not perforated, not erythematous and not bulging. No hemotympanum.   Mouth/Throat: Oropharynx is clear and moist. No oropharyngeal exudate.   Eyes: Pupils are equal, round, and reactive to light. Conjunctivae and EOM are normal.   Neck: No thyromegaly present.   Cardiovascular: Normal rate and regular rhythm. Exam reveals no gallop and no friction rub.   Murmur (systolic) heard.  Pulmonary/Chest: Effort normal and breath sounds normal. No respiratory distress. He has no wheezes. He has no rales.   Abdominal: Soft. Bowel sounds are normal. He exhibits no distension. There is no tenderness. There is no rebound and no guarding.   Musculoskeletal: Normal range of motion. He exhibits no edema or tenderness.   Lymphadenopathy:     He has no cervical adenopathy.   Neurological: He is alert and oriented to person, place, and time.   Skin: Skin is warm. Rash (multiple  seborrheic keratosis on back) noted. He is not diaphoretic. No erythema.       Old records were reviewed. Labs and/or images were independently reviewed.    ASSESSMENT     1. Routine physical examination    2. Essential hypertension    3. Hyperlipidemia, unspecified hyperlipidemia type    4. Type 2 diabetes, uncontrolled, with neuropathy    5. Gastroesophageal reflux disease without esophagitis    6. Idiopathic gout, unspecified chronicity, unspecified site    7. Seborrheic dermatitis    8. Need for Tdap vaccination        PLAN:     Routine physical examination  -     Comprehensive metabolic panel; Future  -     Hemoglobin A1C; Future  -     PSA, Screening; Future; Expected date: 06/10/2020  -     Brain Natriuretic Peptide; Future; Expected date: 06/10/2020  -     We briefly discussed diet, exercise, and routine preventive exams. All questions and comments addressed.    Need for Tdap vaccination  -     Tdap Vaccine      RTC MICHAEL Rodrigues MD  06/10/2020 1:43 PM      Additional Evaluation & Management issues:    In addition to today's Annual Physical, patient has other medical issues that need to be addressed, as well as their associated prescription management that is separate from today's physical, as documented separately below.     HPI  Pt here for follow-up of his chronic conditions. Admits diet/exercise is bad. Been taking meds. Having some skin issues which he would like to be checked for. Patient has no other new complaints/problems at this time.    PMH as above      Review of Systems   Constitutional: Negative for chills, diaphoresis and fever.   HENT: Negative for congestion, ear pain and sore throat.    Eyes: Negative for photophobia and discharge.   Respiratory: Negative for cough, shortness of breath and wheezing.    Cardiovascular: Negative for chest pain and palpitations.   Gastrointestinal: Negative for abdominal pain, constipation, diarrhea, nausea and vomiting.   Genitourinary: Negative for  dysuria and hematuria.   Musculoskeletal: Negative for back pain and myalgias.   Skin: Negative for itching and rash.   Neurological: Negative for dizziness, sensory change, focal weakness, weakness and headaches.   All other systems reviewed and are negative.    Physical Exam   Constitutional: He is oriented to person, place, and time and well-developed, well-nourished, and in no distress. No distress.   HENT:   Head: Normocephalic and atraumatic.   Right Ear: Tympanic membrane is not perforated, not erythematous and not bulging. No hemotympanum.   Left Ear: Tympanic membrane is not perforated, not erythematous and not bulging. No hemotympanum.   Mouth/Throat: Oropharynx is clear and moist. No oropharyngeal exudate.   Eyes: Pupils are equal, round, and reactive to light. Conjunctivae and EOM are normal.   Neck: No thyromegaly present.   Cardiovascular: Normal rate and regular rhythm. Exam reveals no gallop and no friction rub.   Murmur (systolic) heard.  Pulmonary/Chest: Effort normal and breath sounds normal. No respiratory distress. He has no wheezes. He has no rales.   Abdominal: Soft. Bowel sounds are normal. He exhibits no distension. There is no tenderness. There is no rebound and no guarding.   Musculoskeletal: Normal range of motion. He exhibits no edema or tenderness.   Lymphadenopathy:     He has no cervical adenopathy.   Neurological: He is alert and oriented to person, place, and time.   Skin: Skin is warm. Rash (multiple seborrheic keratosis on back) noted. He is not diaphoretic. No erythema.     Essential hypertension  -     amLODIPine (NORVASC) 10 MG tablet; Take 1 tablet (10 mg total) by mouth once daily.  Dispense: 90 tablet; Refill: 0  -     atenoloL-chlorthalidone (TENORETIC) 50-25 mg Tab; Take 1 tablet by mouth once daily.  Dispense: 90 tablet; Refill: 3  -     enalapril (VASOTEC) 20 MG tablet; Take 1 tablet (20 mg total) by mouth 2 (two) times daily.  Dispense: 180 tablet; Refill: 3  -      Stable. Continue current regimen.    Hyperlipidemia, unspecified hyperlipidemia type  -     atorvastatin (LIPITOR) 40 MG tablet; Take 1 tablet (40 mg total) by mouth once daily  Dispense: 90 tablet; Refill: 3  -     Counseled patient about healthy diet, exercise habits, and to increase physical activity.    Type 2 diabetes, uncontrolled, with neuropathy  -     insulin glargine (LANTUS U-100 INSULIN) 100 unit/mL injection; INJECT 85 UNITS UNDER THE SKIN AT BEDTIME  Dispense: 30 mL; Refill: 0  -     insulin aspart U-100 (NOVOLOG FLEXPEN U-100 INSULIN) 100 unit/mL (3 mL) InPn pen; Inject 35 Units into the skin 3 (three) times daily before meals.  Dispense: 5 Box; Refill: 3  -     dulaglutide (TRULICITY) 0.75 mg/0.5 mL PnIj; Inject 0.5 mLs (0.75 mg total) into the skin every 7 days.  Dispense: 12 Syringe; Refill: 3  -     Comprehensive metabolic panel; Future  -     Hemoglobin A1C; Future  -     PSA, Screening; Future; Expected date: 06/10/2020  -     Instructed patient to take daily glucose AM logs and to write them down to bring with on next visit. Advised patient to decrease intake of carbohydrates/simple sugars.         Gastroesophageal reflux disease without esophagitis  -     omeprazole (PRILOSEC) 40 MG capsule; TAKE ONE CAPSULE BY MOUTH IN THE MORNING BEFORE BREAKFAST DAILY  Dispense: 90 capsule; Refill: 0  -     Stable. Continue current regimen.    Idiopathic gout, unspecified chronicity, unspecified site  -     allopurinoL (ZYLOPRIM) 100 MG tablet; Take 1 tablet (100 mg total) by mouth once daily.  Dispense: 90 tablet; Refill: 0  -     Stable. Continue current regimen.    Seborrheic dermatitis  -     Ambulatory referral/consult to Dermatology; Future; Expected date: 06/17/2020    RTC PRN

## 2020-06-11 ENCOUNTER — PATIENT MESSAGE (OUTPATIENT)
Dept: FAMILY MEDICINE | Facility: CLINIC | Age: 60
End: 2020-06-11

## 2020-06-12 RX ORDER — INSULIN ASPART 100 [IU]/ML
17 INJECTION, SOLUTION INTRAVENOUS; SUBCUTANEOUS
Qty: 5 BOX | Refills: 3
Start: 2020-06-12 | End: 2020-08-28

## 2020-06-12 RX ORDER — INSULIN GLARGINE 100 [IU]/ML
40 INJECTION, SOLUTION SUBCUTANEOUS NIGHTLY
Qty: 30 ML | Refills: 0
Start: 2020-06-12 | End: 2020-08-28

## 2020-07-01 ENCOUNTER — PATIENT MESSAGE (OUTPATIENT)
Dept: FAMILY MEDICINE | Facility: CLINIC | Age: 60
End: 2020-07-01

## 2020-07-02 RX ORDER — BLOOD-GLUCOSE CONTROL, NORMAL
1 EACH MISCELLANEOUS 3 TIMES DAILY
Qty: 200 EACH | Refills: 3 | Status: SHIPPED | OUTPATIENT
Start: 2020-07-02 | End: 2020-07-02 | Stop reason: SDUPTHER

## 2020-07-02 RX ORDER — PEN NEEDLE, DIABETIC 29 G X1/2"
1 NEEDLE, DISPOSABLE MISCELLANEOUS 3 TIMES DAILY
Qty: 100 EACH | Refills: 5 | COMMUNITY
Start: 2020-07-02 | End: 2020-07-02

## 2020-07-02 RX ORDER — PEN NEEDLE, DIABETIC 30 GX3/16"
1 NEEDLE, DISPOSABLE MISCELLANEOUS 3 TIMES DAILY
Qty: 200 EACH | Refills: 3 | Status: SHIPPED | OUTPATIENT
Start: 2020-07-02 | End: 2020-12-09

## 2020-07-02 RX ORDER — BLOOD-GLUCOSE CONTROL, NORMAL
1 EACH MISCELLANEOUS 3 TIMES DAILY
Qty: 200 EACH | Refills: 3 | Status: SHIPPED | OUTPATIENT
Start: 2020-07-02 | End: 2021-04-20

## 2020-07-02 RX ORDER — CALCIUM CARB/VITAMIN D3/VIT K1 500-100-40
1 TABLET,CHEWABLE ORAL 3 TIMES DAILY
Qty: 200 EACH | Refills: 5 | Status: SHIPPED | OUTPATIENT
Start: 2020-07-02 | End: 2020-12-09

## 2020-07-06 RX ORDER — DULAGLUTIDE 1.5 MG/.5ML
1.5 INJECTION, SOLUTION SUBCUTANEOUS
Qty: 12 PEN | Refills: 3 | Status: SHIPPED | OUTPATIENT
Start: 2020-07-06 | End: 2020-07-09 | Stop reason: SDUPTHER

## 2020-07-07 ENCOUNTER — PATIENT OUTREACH (OUTPATIENT)
Dept: ADMINISTRATIVE | Facility: OTHER | Age: 60
End: 2020-07-07

## 2020-07-08 ENCOUNTER — OFFICE VISIT (OUTPATIENT)
Dept: OPTOMETRY | Facility: CLINIC | Age: 60
End: 2020-07-08
Payer: COMMERCIAL

## 2020-07-08 ENCOUNTER — LAB VISIT (OUTPATIENT)
Dept: LAB | Facility: HOSPITAL | Age: 60
End: 2020-07-08
Attending: FAMILY MEDICINE
Payer: COMMERCIAL

## 2020-07-08 DIAGNOSIS — Z00.00 ROUTINE PHYSICAL EXAMINATION: ICD-10-CM

## 2020-07-08 DIAGNOSIS — E11.3293 MILD NONPROLIFERATIVE DIABETIC RETINOPATHY OF BOTH EYES WITHOUT MACULAR EDEMA ASSOCIATED WITH TYPE 2 DIABETES MELLITUS: Primary | ICD-10-CM

## 2020-07-08 DIAGNOSIS — H52.7 REFRACTIVE ERROR: ICD-10-CM

## 2020-07-08 LAB
ESTIMATED AVG GLUCOSE: 212 MG/DL (ref 68–131)
HBA1C MFR BLD HPLC: 9 % (ref 4–5.6)

## 2020-07-08 PROCEDURE — 92015 PR REFRACTION: ICD-10-PCS | Mod: S$GLB,,, | Performed by: OPTOMETRIST

## 2020-07-08 PROCEDURE — 92014 PR EYE EXAM, EST PATIENT,COMPREHESV: ICD-10-PCS | Mod: S$GLB,,, | Performed by: OPTOMETRIST

## 2020-07-08 PROCEDURE — 80053 COMPREHEN METABOLIC PANEL: CPT

## 2020-07-08 PROCEDURE — 84153 ASSAY OF PSA TOTAL: CPT

## 2020-07-08 PROCEDURE — 92014 COMPRE OPH EXAM EST PT 1/>: CPT | Mod: S$GLB,,, | Performed by: OPTOMETRIST

## 2020-07-08 PROCEDURE — 83880 ASSAY OF NATRIURETIC PEPTIDE: CPT

## 2020-07-08 PROCEDURE — 92015 DETERMINE REFRACTIVE STATE: CPT | Mod: S$GLB,,, | Performed by: OPTOMETRIST

## 2020-07-08 PROCEDURE — 83036 HEMOGLOBIN GLYCOSYLATED A1C: CPT

## 2020-07-08 PROCEDURE — 99999 PR PBB SHADOW E&M-EST. PATIENT-LVL III: CPT | Mod: PBBFAC,,, | Performed by: OPTOMETRIST

## 2020-07-08 PROCEDURE — 36415 COLL VENOUS BLD VENIPUNCTURE: CPT | Mod: PO

## 2020-07-08 PROCEDURE — 99999 PR PBB SHADOW E&M-EST. PATIENT-LVL III: ICD-10-PCS | Mod: PBBFAC,,, | Performed by: OPTOMETRIST

## 2020-07-08 NOTE — PROGRESS NOTES
Subjective:       Patient ID: Connor Zuluaga is a 59 y.o. male      Chief Complaint   Patient presents with    Concerns About Ocular Health    Diabetic Eye Exam     History of Present Illness  Dls: 4/17/19 Dr. Christie    60 y/o male presents today for diabetic eye exam.   Pt states no changes in vision. Pt wears pal's.         No tearing  No itching  No burning  No pain  No ha's  No floaters  No flashes    Eye meds  None    Hemoglobin A1C       Date                     Value               Ref Range           Status                05/29/2020               8.6 (H)             4.0 - 5.6 %         Final               11/21/2017               6.6 (H)             4.0 - 5.6 %         Final           02/10/2017               9.5 (H)             4.5 - 6.2 %         Final               Assessment/Plan:     1. Mild nonproliferative diabetic retinopathy of both eyes without macular edema associated with type 2 diabetes mellitus  No ocular treatment needed at this time. Educated patient about effects of diabetes on vision. Emphasized importance of good blood sugar control, compliance with medications, and follow up care with PCP. Return in 1 years for DFE OU, sooner PRN.    2. Refractive error  Educated patient on refractive error and discussed lens options. Dispensed updated spectacle Rx. Educated about adaptation period to new specs.    Eyeglass Final Rx     Eyeglass Final Rx       Sphere Cylinder Axis Add    Right -5.50 +0.75 130 +2.50    Left -3.75 +1.00 035 +2.50    Expiration Date: 7/9/2021                  Follow up in about 1 year (around 7/8/2021) for Diabetic Eye Exam.

## 2020-07-08 NOTE — LETTER
July 8, 2020      Zaire Rodrigues MD  [address]  [place] [address]           [facility]  [address]  [place] [address]  Phone: [phone]  Fax: [phone]          Patient: Connor Zuluaga   MR Number: [id number]   YOB: 1960   Date of Visit: 7/8/2020       Dear Dr. Zaire Rodrigues:    Thank you for referring Connor Zuluaga to me for evaluation. Attached you will find relevant portions of my assessment and plan of care.    If you have questions, please do not hesitate to call me. I look forward to following Connor Zuluaga along with you.    Sincerely,    Yvonne Christie, OD    Enclosure  CC:  No Recipients    If you would like to receive this communication electronically, please contact [email or web address] or [phone] to request more information on [employer] access.    For providers and/or their staff who would like to refer a patient to [facility], please contact us through our one-stop-shop provider referral line, [facility], at [phone].    If you feel you have received this communication in error or would no longer like to receive these types of communications, please e-mail [email or web address]

## 2020-07-09 ENCOUNTER — TELEPHONE (OUTPATIENT)
Dept: FAMILY MEDICINE | Facility: CLINIC | Age: 60
End: 2020-07-09

## 2020-07-09 LAB
ALBUMIN SERPL BCP-MCNC: 4.2 G/DL (ref 3.5–5.2)
ALP SERPL-CCNC: 77 U/L (ref 55–135)
ALT SERPL W/O P-5'-P-CCNC: 31 U/L (ref 10–44)
ANION GAP SERPL CALC-SCNC: 12 MMOL/L (ref 8–16)
AST SERPL-CCNC: 22 U/L (ref 10–40)
BILIRUB SERPL-MCNC: 0.8 MG/DL (ref 0.1–1)
BNP SERPL-MCNC: <10 PG/ML (ref 0–99)
BUN SERPL-MCNC: 18 MG/DL (ref 6–20)
CALCIUM SERPL-MCNC: 9.7 MG/DL (ref 8.7–10.5)
CHLORIDE SERPL-SCNC: 97 MMOL/L (ref 95–110)
CO2 SERPL-SCNC: 28 MMOL/L (ref 23–29)
COMPLEXED PSA SERPL-MCNC: 0.29 NG/ML (ref 0–4)
CREAT SERPL-MCNC: 1.2 MG/DL (ref 0.5–1.4)
EST. GFR  (AFRICAN AMERICAN): >60 ML/MIN/1.73 M^2
EST. GFR  (NON AFRICAN AMERICAN): >60 ML/MIN/1.73 M^2
GLUCOSE SERPL-MCNC: 276 MG/DL (ref 70–110)
POTASSIUM SERPL-SCNC: 4.1 MMOL/L (ref 3.5–5.1)
PROT SERPL-MCNC: 7.7 G/DL (ref 6–8.4)
SODIUM SERPL-SCNC: 137 MMOL/L (ref 136–145)

## 2020-07-09 RX ORDER — DULAGLUTIDE 1.5 MG/.5ML
1.5 INJECTION, SOLUTION SUBCUTANEOUS
Qty: 12 PEN | Refills: 3 | Status: SHIPPED | OUTPATIENT
Start: 2020-07-09 | End: 2021-03-09 | Stop reason: SDUPTHER

## 2020-07-10 ENCOUNTER — TELEPHONE (OUTPATIENT)
Dept: FAMILY MEDICINE | Facility: CLINIC | Age: 60
End: 2020-07-10

## 2020-07-16 ENCOUNTER — OFFICE VISIT (OUTPATIENT)
Dept: ENDOCRINOLOGY | Facility: CLINIC | Age: 60
End: 2020-07-16
Payer: COMMERCIAL

## 2020-07-16 VITALS
DIASTOLIC BLOOD PRESSURE: 74 MMHG | HEART RATE: 79 BPM | WEIGHT: 258 LBS | BODY MASS INDEX: 35.98 KG/M2 | SYSTOLIC BLOOD PRESSURE: 134 MMHG | TEMPERATURE: 98 F

## 2020-07-16 DIAGNOSIS — E11.3293 MILD NONPROLIFERATIVE DIABETIC RETINOPATHY OF BOTH EYES WITHOUT MACULAR EDEMA ASSOCIATED WITH TYPE 2 DIABETES MELLITUS: ICD-10-CM

## 2020-07-16 DIAGNOSIS — I10 ESSENTIAL HYPERTENSION: ICD-10-CM

## 2020-07-16 DIAGNOSIS — E78.5 HYPERLIPIDEMIA, UNSPECIFIED HYPERLIPIDEMIA TYPE: ICD-10-CM

## 2020-07-16 PROCEDURE — 3052F HG A1C>EQUAL 8.0%<EQUAL 9.0%: CPT | Mod: CPTII,S$GLB,, | Performed by: NURSE PRACTITIONER

## 2020-07-16 PROCEDURE — 3078F PR MOST RECENT DIASTOLIC BLOOD PRESSURE < 80 MM HG: ICD-10-PCS | Mod: CPTII,S$GLB,, | Performed by: NURSE PRACTITIONER

## 2020-07-16 PROCEDURE — 3052F PR MOST RECENT HEMOGLOBIN A1C LEVEL 8.0 - < 9.0%: ICD-10-PCS | Mod: CPTII,S$GLB,, | Performed by: NURSE PRACTITIONER

## 2020-07-16 PROCEDURE — 3075F SYST BP GE 130 - 139MM HG: CPT | Mod: CPTII,S$GLB,, | Performed by: NURSE PRACTITIONER

## 2020-07-16 PROCEDURE — 99999 PR PBB SHADOW E&M-EST. PATIENT-LVL V: CPT | Mod: PBBFAC,,, | Performed by: NURSE PRACTITIONER

## 2020-07-16 PROCEDURE — 99204 PR OFFICE/OUTPT VISIT, NEW, LEVL IV, 45-59 MIN: ICD-10-PCS | Mod: S$GLB,,, | Performed by: NURSE PRACTITIONER

## 2020-07-16 PROCEDURE — 99204 OFFICE O/P NEW MOD 45 MIN: CPT | Mod: S$GLB,,, | Performed by: NURSE PRACTITIONER

## 2020-07-16 PROCEDURE — 3075F PR MOST RECENT SYSTOLIC BLOOD PRESS GE 130-139MM HG: ICD-10-PCS | Mod: CPTII,S$GLB,, | Performed by: NURSE PRACTITIONER

## 2020-07-16 PROCEDURE — 3008F PR BODY MASS INDEX (BMI) DOCUMENTED: ICD-10-PCS | Mod: CPTII,S$GLB,, | Performed by: NURSE PRACTITIONER

## 2020-07-16 PROCEDURE — 99999 PR PBB SHADOW E&M-EST. PATIENT-LVL V: ICD-10-PCS | Mod: PBBFAC,,, | Performed by: NURSE PRACTITIONER

## 2020-07-16 PROCEDURE — 3008F BODY MASS INDEX DOCD: CPT | Mod: CPTII,S$GLB,, | Performed by: NURSE PRACTITIONER

## 2020-07-16 PROCEDURE — 3078F DIAST BP <80 MM HG: CPT | Mod: CPTII,S$GLB,, | Performed by: NURSE PRACTITIONER

## 2020-07-16 NOTE — PATIENT INSTRUCTIONS
A1C goal: <7%  Fasting/premeal blood glucose goal:   2 hour post-meal blood glucose goal: less than 180      Check glucose before each meal and bedtime and anytime you feel your glucose is too low. Monitor glucose every 8 hours at least to continuous glucose reading with Freestyle Chyna. Look into Skin Tac and Simpatch to help with adhesion.   Start exercise regimen - try walking every morning.   Declines visit with dietician. He will try to improve diet. Advised him to start meal planning and start cooking his own healthy meals, with moderate carbohydrate intake.   Continue current meds.   Return to clinic in 4 weeks.         RULE OF 15 FOR TREATMENT OF LOW BLOOD GLUCOSE:    Feelings of low blood sugar include blurry vision, heavy sweating, dizziness, shakiness, confusion, fast heartbeat or headache. What to do:     1. Check your blood sugar right away.   2. If your blood sugar is less than 80, eat three to four glucose tablets or 1/2 cup of fruit juice, sugar soda, or milk   3. Check your blood sugar again after 15 minutes   4. If it is still low, repeat step 2 and check sugar again after 15 minutes  5. Seek medical attention if blood sugar remains less than 70 mg/dL.      Carry foods with you that you can use for quick treatment.  This could include glucose tablets, peppermints or other hard candies (not sugar free), juice packs.    Keep at your bedside your glucose meter and juice pack, soda can, or peppermints to help you in case you have a low blood sugar overnight.     Should note possible cause of hypoglycemia in blood glucose logs so that you may keep these incidents to a minimum.

## 2020-07-16 NOTE — PROGRESS NOTES
CC: This 59 y.o. White male  is here for evaluation of  T2DM along with comorbidities indicated in the Visit Diagnosis section of this encounter.    HPI: Connor Zuluaga was diagnosed with T2DM in his early 40s. Metformin started at the time of diagnosis.     New to Endocrine. Referred by Dr. Zaire Rodrigues.   He was taking Lantus 85 units hs and Humalog 35 units ac. BGs kevin when Humalog was reduced per Dr. Rodrigues. Trulicity added shortly after at 0.75 mg. He increased dose to 1.5 mg last Saturday.   He has lost 7 lb. Appetite is lower and not snacking as much on Trulicity. No nausea. He was snacking a lot during stay-at-home order.     Smokes 1 ppd. Not ready to stop smoking.      LAST DIABETES EDUCATION: dietician visit at [facility]     HOSPITALIZED FOR DIABETES -  No.    PRESCRIBED DIABETES MEDICATIONS: metformin 1000 mg bid, Trulicity 1.5 mg once weekly, Lantus Solostar 40 units hs ~ 9 pm, Novolog Flexpen 17 units TID ac     Misses medication doses - No    DM COMPLICATIONS: nephropathy, retinopathy and peripheral neuropathy    SIGNIFICANT DIABETES MED HISTORY: denies intolerance to prior DM meds     SELF MONITORING BLOOD GLUCOSE: Checks blood glucose at home a few times a week. Waiting for a new glucometer.    yesterday and today was 143. BGs were averaging 140-150s prior to insulin changes.     HYPOGLYCEMIC EPISODES: occurs only when he has a long time between meals. Does not confirm with BG reading. Very infrequent.      CURRENT DIET: trying to cut back on diet coke. Drinks water and a few beers on the weekend sometimes. Eats 3 meals/day with at least 2 snacks a day. Snacks - nuts, peanut butter crackers. No fruit.     Diet recall: breakfast with a sausage mcmuffin with diet coke. Supper was homemade chicken noodle soup, tea with splenda. Lunch was ? . Breakfast was a sandwich. No snacks yesterday.     Admits he has a lot of room to improve. He knows what he has to do.     CURRENT EXERCISE: none      SOCIAL:  working from home currently           /77 (BP Location: Right arm, Patient Position: Sitting, BP Method: Large (Automatic))   Pulse 79   Temp 98.4 °F (36.9 °C) (Oral)   Wt 117 kg (258 lb)   BMI 35.98 kg/m²       ROS:   CONSTITUTIONAL: Appetite good, denies fatigue  SKIN: No rash or pruritis   EYES: No visual disturbances  RESPIRATORY: No shortness of breath or cough  CARDIAC: No chest pain or palpitations  GI: No nausea, vomiting; intermittent diarrhea  : No urinary frequency or dysuria   MS: No arthralgias or mylagias   NEURO: + paresthesias to feet   OTHER: feels thirsty when BG is high           PHYSICAL EXAM:  GENERAL: Well developed, well nourished. No acute distress.   PSYCH: AAOx3, appropriate mood and affect, conversant, well-groomed. Judgement and insight good.   NEURO: Cranial nerves grossly intact. Speech clear, no tremor.   NECK: Trachea midline, no thyromegaly or lymphadenopathy.   CHEST: Respirations even and unlabored. CTA bilaterally.  CARDIOVASCULAR: Regular rate and rhythm. + bruits. + murmur. No edema.   ABDOMEN: Soft, non-tender, non-distended. Bowel sounds present.   MS: Gait steady. No clubbing.   SKIN: Normal skin turgor. Skin warm and dry. No areas of breakdown. No acanthosis nigricans.  FEET:     Protective Sensation (w/ 10 gram monofilament):  Right: Intact  Left: Intact    Visual Inspection:  Skin Breakdown -  Neither  + bilateral digital contractures     Pedal Pulses:   Right: Diminshed  Left: Present    Posterior tibialis:   Right:Diminshed  Left: Diminshed      Hemoglobin A1C   Date Value Ref Range Status   07/08/2020 9.0 (H) 4.0 - 5.6 % Final     Comment:     ADA Screening Guidelines:  5.7-6.4%  Consistent with prediabetes  >or=6.5%  Consistent with diabetes  High levels of fetal hemoglobin interfere with the HbA1C  assay. Heterozygous hemoglobin variants (HbS, HgC, etc)do  not significantly interfere with this assay.   However, presence of  multiple variants may affect accuracy.     05/29/2020 8.6 (H) 4.0 - 5.6 % Final     Comment:     ADA Screening Guidelines:  5.7-6.4%  Consistent with prediabetes  >or=6.5%  Consistent with diabetes  High levels of fetal hemoglobin interfere with the HbA1C  assay. Heterozygous hemoglobin variants (HbS, HgC, etc)do  not significantly interfere with this assay.   However, presence of multiple variants may affect accuracy.     11/21/2017 6.6 (H) 4.0 - 5.6 % Final     Comment:     According to ADA guidelines, hemoglobin A1c <7.0% represents  optimal control in non-pregnant diabetic patients. Different  metrics may apply to specific patient populations.   Standards of Medical Care in Diabetes-2016.  For the purpose of screening for the presence of diabetes:  <5.7%     Consistent with the absence of diabetes  5.7-6.4%  Consistent with increasing risk for diabetes   (prediabetes)  >or=6.5%  Consistent with diabetes  Currently, no consensus exists for use of hemoglobin A1c  for diagnosis of diabetes for children.  This Hemoglobin A1c assay has significant interference with fetal   hemoglobin   (HbF). The results are invalid for patients with abnormal amounts of   HbF,   including those with known Hereditary Persistence   of Fetal Hemoglobin. Heterozygous hemoglobin variants (HbAS, HbAC,   HbAD, HbAE, HbA2) do not significantly interfere with this assay;   however, presence of multiple variants in a sample may impact the %   interference.             Chemistry        Component Value Date/Time     07/08/2020 1348    K 4.1 07/08/2020 1348    CL 97 07/08/2020 1348    CO2 28 07/08/2020 1348    BUN 18 07/08/2020 1348    CREATININE 1.2 07/08/2020 1348     (H) 07/08/2020 1348        Component Value Date/Time    CALCIUM 9.7 07/08/2020 1348    ALKPHOS 77 07/08/2020 1348    AST 22 07/08/2020 1348    ALT 31 07/08/2020 1348    BILITOT 0.8 07/08/2020 1348    ESTGFRAFRICA >60.0 07/08/2020 1348    EGFRNONAA >60.0 07/08/2020  1348          Lab Results   Component Value Date    LDLCALC 96.4 05/29/2020        Ref. Range 5/29/2020 10:20   Cholesterol Latest Ref Range: 120 - 199 mg/dL 159   HDL Latest Ref Range: 40 - 75 mg/dL 39 (L)   Hdl/Cholesterol Ratio Latest Ref Range: 20.0 - 50.0 % 24.5   LDL Cholesterol External Latest Ref Range: 63.0 - 159.0 mg/dL 96.4   Non-HDL Cholesterol Latest Units: mg/dL 120   Total Cholesterol/HDL Ratio Latest Ref Range: 2.0 - 5.0  4.1   Triglycerides Latest Ref Range: 30 - 150 mg/dL 118     Lab Results   Component Value Date    MICALBCREAT 555.0 (H) 05/29/2020         STANDARDS of CARE:        ASA:               Last eye exam: 7/2020      ASSESSMENT and PLAN:    A1C GOAL: < 7 %     1. Type 2 diabetes, uncontrolled, with neuropathy  Discussed progression of DM disease, long term complications, and tx options. Reviewed A1c and BG goals. Reviewed hypoglycemia s/s.      Check glucose before each meal and bedtime and anytime you feel your glucose is too low. Monitor glucose every 8 hours at least to continuous glucose reading with Freestyle Chyna. Look into Skin Tac and Simpatch to help with adhesion.   Start exercise regimen - try walking every morning.   Declines visit with dietician. He will try to improve diet. Advised him to start meal planning and start cooking his own healthy meals, with moderate carbohydrate intake.   Continue current meds.   f/u in 4 weeks with virtual visit.     Ambulatory referral/consult to Endocrinology   2. Mild nonproliferative diabetic retinopathy of both eyes without macular edema associated with type 2 diabetes mellitus  Improve glycemic control.      3. Essential hypertension  Controlled    4. HLD Continue atorvastatin 40 mg        No orders of the defined types were placed in this encounter.       Follow up in about 4 weeks (around 8/13/2020).     Thank you very much for allowing me to participate in Connor Zuluaga's care.

## 2020-07-16 NOTE — LETTER
July 16, 2020      Zaire Rodrigues MD  [address] [place] [address]           [facility]  [address] [place] [address]  Phone: [phone]  Fax: [phone]          Patient: Connor Zuluaga   MR Number: [id number]   YOB: 1960   Date of Visit: 7/16/2020       Dear Dr. Zaire Rodrigues:    Thank you for referring Connor Zuluaga to me for evaluation. Attached you will find relevant portions of my assessment and plan of care.    If you have questions, please do not hesitate to call me. I look forward to following Connor Zuluaga along with you.    Sincerely,    Maria Rodrigues, GUME    Enclosure  CC:  No Recipients    If you would like to receive this communication electronically, please contact [email or web address] or [phone] to request more information on [employer] access.    For providers and/or their staff who would like to refer a patient to [facility], please contact us through our one-stop-shop provider referral line, [facility], at [phone].    If you feel you have received this communication in error or would no longer like to receive these types of communications, please e-mail [email or web address]

## 2020-08-06 ENCOUNTER — TELEPHONE (OUTPATIENT)
Dept: FAMILY MEDICINE | Facility: CLINIC | Age: 60
End: 2020-08-06

## 2020-08-17 ENCOUNTER — OFFICE VISIT (OUTPATIENT)
Dept: ENDOCRINOLOGY | Facility: CLINIC | Age: 60
End: 2020-08-17
Payer: COMMERCIAL

## 2020-08-17 DIAGNOSIS — Z71.9 VISIT FOR COUNSELING: ICD-10-CM

## 2020-08-17 PROCEDURE — 99213 OFFICE O/P EST LOW 20 MIN: CPT | Mod: 95,,, | Performed by: NURSE PRACTITIONER

## 2020-08-17 PROCEDURE — 3052F PR MOST RECENT HEMOGLOBIN A1C LEVEL 8.0 - < 9.0%: ICD-10-PCS | Mod: CPTII,,, | Performed by: NURSE PRACTITIONER

## 2020-08-17 PROCEDURE — 99213 PR OFFICE/OUTPT VISIT, EST, LEVL III, 20-29 MIN: ICD-10-PCS | Mod: 95,,, | Performed by: NURSE PRACTITIONER

## 2020-08-17 PROCEDURE — 3052F HG A1C>EQUAL 8.0%<EQUAL 9.0%: CPT | Mod: CPTII,,, | Performed by: NURSE PRACTITIONER

## 2020-08-17 NOTE — PROGRESS NOTES
The patient location is: home  The chief complaint leading to consultation is: type 2 diabetes    Visit type: audiovisual    Face to Face time with patient: 19 minutes of total time spent on the encounter, which includes face to face time and non-face to face time preparing to see the patient (eg, review of tests), Obtaining and/or reviewing separately obtained history, Documenting clinical information in the electronic or other health record, Independently interpreting results (not separately reported) and communicating results to the patient/family/caregiver, or Care coordination (not separately reported).         Each patient to whom he or she provides medical services by telemedicine is:  (1) informed of the relationship between the physician and patient and the respective role of any other health care provider with respect to management of the patient; and (2) notified that he or she may decline to receive medical services by telemedicine and may withdraw from such care at any time.    Notes:       CC: This 60 y.o. White male  is here for evaluation of  T2DM along with comorbidities indicated in the Visit Diagnosis section of this encounter.    HPI: Connor Zuluaga was diagnosed with T2DM in his early 40s. Metformin started at the time of diagnosis.     Initial visit 7/16/20  New to Endocrine. Referred by Dr. Zaire Rodrigues.   He was taking Lantus 85 units hs and Humalog 35 units ac. BGs kevin when Humalog was reduced per Dr. Rodrigues. Trulicity added shortly after at 0.75 mg. He increased dose to 1.5 mg last Saturday.   He has lost 7 lb. Appetite is lower and not snacking as much on Trulicity. No nausea. He was snacking a lot during stay-at-home order.   Smokes 1 ppd. Not ready to stop smoking.  Plan Check glucose before each meal and bedtime and anytime you feel your glucose is too low. Monitor glucose every 8 hours at least to continuous glucose reading with Freestyle Chyna. Look into Skin Tac and Simpatch to  "help with adhesion.   Start exercise regimen - try walking every morning.   Declines visit with dietician. He will try to improve diet. Advised him to start meal planning and start cooking his own healthy meals, with moderate carbohydrate intake.   Continue current meds.   f/u in 4 weeks with virtual visit.     Interval history  He has tried Chyna sensors but he's had one sensor fall off and another one that didn't work. Insurance also doesn't cover it.   Trying to eat healthier with smaller food portions. Thinks he lost a few pounds.       LAST DIABETES EDUCATION: dietician visit at [place]     HOSPITALIZED FOR DIABETES -  No.    PRESCRIBED DIABETES MEDICATIONS: metformin 1000 mg bid, Trulicity 1.5 mg once weekly, Lantus Solostar 40 units hs ~ 9 pm, Novolog Flexpen 17 units TID ac     Misses medication doses - No    DM COMPLICATIONS: nephropathy, retinopathy and peripheral neuropathy    SIGNIFICANT DIABETES MED HISTORY: denies intolerance to prior DM meds     SELF MONITORING BLOOD GLUCOSE: Checks blood glucose at home 1x/day.    FBGs 103-150s; 216 this morning     HYPOGLYCEMIC EPISODES: none    CURRENT DIET: trying to cut back on diet coke. Drinks water and a few beers on the weekend sometimes. Eats 3 meals/day with at least 2 snacks a day. Snacks - nuts, peanut butter crackers. No fruit.     Diet recall: breakfast was 2 boiled eggs. Dinner cornbread dressing with chicken; bedtime ate cereal Special K with milk. Lunch yesterday was ?       CURRENT EXERCISE: none -  "I'm lazy."     SOCIAL:  working from home currently           There were no vitals taken for this visit.      ROS:   CONSTITUTIONAL: Appetite good, denies fatigue  RESPIRATORY: No shortness of breath  CARDIAC: No chest pain           PHYSICAL EXAM:  GENERAL: Well developed, well nourished. No acute distress.   PSYCH: AAOx3, appropriate mood and affect, conversant, well-groomed. Judgement and insight good.   NEURO: Cranial nerves " grossly intact. Speech clear, no tremor.       Hemoglobin A1C   Date Value Ref Range Status   07/08/2020 9.0 (H) 4.0 - 5.6 % Final     Comment:     ADA Screening Guidelines:  5.7-6.4%  Consistent with prediabetes  >or=6.5%  Consistent with diabetes  High levels of fetal hemoglobin interfere with the HbA1C  assay. Heterozygous hemoglobin variants (HbS, HgC, etc)do  not significantly interfere with this assay.   However, presence of multiple variants may affect accuracy.     05/29/2020 8.6 (H) 4.0 - 5.6 % Final     Comment:     ADA Screening Guidelines:  5.7-6.4%  Consistent with prediabetes  >or=6.5%  Consistent with diabetes  High levels of fetal hemoglobin interfere with the HbA1C  assay. Heterozygous hemoglobin variants (HbS, HgC, etc)do  not significantly interfere with this assay.   However, presence of multiple variants may affect accuracy.     11/21/2017 6.6 (H) 4.0 - 5.6 % Final     Comment:     According to ADA guidelines, hemoglobin A1c <7.0% represents  optimal control in non-pregnant diabetic patients. Different  metrics may apply to specific patient populations.   Standards of Medical Care in Diabetes-2016.  For the purpose of screening for the presence of diabetes:  <5.7%     Consistent with the absence of diabetes  5.7-6.4%  Consistent with increasing risk for diabetes   (prediabetes)  >or=6.5%  Consistent with diabetes  Currently, no consensus exists for use of hemoglobin A1c  for diagnosis of diabetes for children.  This Hemoglobin A1c assay has significant interference with fetal   hemoglobin   (HbF). The results are invalid for patients with abnormal amounts of   HbF,   including those with known Hereditary Persistence   of Fetal Hemoglobin. Heterozygous hemoglobin variants (HbAS, HbAC,   HbAD, HbAE, HbA2) do not significantly interfere with this assay;   however, presence of multiple variants in a sample may impact the %   interference.             Chemistry        Component Value Date/Time    NA  137 07/08/2020 1348    K 4.1 07/08/2020 1348    CL 97 07/08/2020 1348    CO2 28 07/08/2020 1348    BUN 18 07/08/2020 1348    CREATININE 1.2 07/08/2020 1348     (H) 07/08/2020 1348        Component Value Date/Time    CALCIUM 9.7 07/08/2020 1348    ALKPHOS 77 07/08/2020 1348    AST 22 07/08/2020 1348    ALT 31 07/08/2020 1348    BILITOT 0.8 07/08/2020 1348    ESTGFRAFRICA >60.0 07/08/2020 1348    EGFRNONAA >60.0 07/08/2020 1348          Lab Results   Component Value Date    LDLCALC 96.4 05/29/2020        Ref. Range 5/29/2020 10:20   Cholesterol Latest Ref Range: 120 - 199 mg/dL 159   HDL Latest Ref Range: 40 - 75 mg/dL 39 (L)   Hdl/Cholesterol Ratio Latest Ref Range: 20.0 - 50.0 % 24.5   LDL Cholesterol External Latest Ref Range: 63.0 - 159.0 mg/dL 96.4   Non-HDL Cholesterol Latest Units: mg/dL 120   Total Cholesterol/HDL Ratio Latest Ref Range: 2.0 - 5.0  4.1   Triglycerides Latest Ref Range: 30 - 150 mg/dL 118     Lab Results   Component Value Date    MICALBCREAT 555.0 (H) 05/29/2020         STANDARDS of CARE:        ASA:               Last eye exam: 7/2020      ASSESSMENT and PLAN:    A1C GOAL: < 7 %   1. Type 2 diabetes, uncontrolled, with neuropathy  Best to eat 3 balanced meals with small amount of carbs as well as nonstarchy fiber and protein.   Unable to adjust insulin doses without glucose logs.   Best to test before meals and bedtime, 4x/day.   Improve diet and ideally start exercise. Pt admits unlikely to start exercising.  Pt would like to follow up in a month. Will do a virtual visit. He will send a message with log before visit.          Spent 19 minutes with patient with >50% time spent in counseling, as noted in # 1.     No orders of the defined types were placed in this encounter.       Follow up in about 4 weeks (around 9/14/2020).

## 2020-08-17 NOTE — PATIENT INSTRUCTIONS
Best to eat 3 balanced meals with small amount of carbs as well as nonstarchy fiber and protein.   Unable to adjust insulin doses without glucose logs.   Best to test before meals and bedtime, 4x/day.   Improve diet and ideally start exercise. Pt admits unlikely to start exercising.  Follow up in a month - virtual visit. He will send a message with log before visit.

## 2020-08-28 RX ORDER — INSULIN GLARGINE 100 [IU]/ML
INJECTION, SOLUTION SUBCUTANEOUS
Qty: 50 ML | Refills: 0 | Status: SHIPPED | OUTPATIENT
Start: 2020-08-28 | End: 2020-10-28

## 2020-08-28 RX ORDER — INSULIN ASPART 100 [IU]/ML
INJECTION, SOLUTION INTRAVENOUS; SUBCUTANEOUS
Qty: 90 ML | Refills: 0 | Status: SHIPPED | OUTPATIENT
Start: 2020-08-28 | End: 2020-11-02 | Stop reason: SDUPTHER

## 2020-08-28 NOTE — PROGRESS NOTES
Refill Authorization Note    is requesting a refill authorization.    Brief assessment and rationale for refill: APPROVE: prr          Medication Therapy Plan: CDMR: CDM recommends uric-no adherence on allopurinol-will not order at this time; A1C > 8; approve 3 more each    Medication reconciliation completed: No                    Comments:      Orders Placed This Encounter    LANTUS U-100 INSULIN 100 unit/mL injection    NOVOLOG FLEXPEN U-100 INSULIN 100 unit/mL (3 mL) InPn pen      Requested Prescriptions   Signed Prescriptions Disp Refills    LANTUS U-100 INSULIN 100 unit/mL injection 50 mL 0     Sig: INJECT 85 UNITS UNDER THE SKIN AT BEDTIME       Endocrinology:  Diabetes - Insulins Failed - 8/28/2020  9:21 AM        Failed - HBA1C is 7.9 or below and within 180 days     Hemoglobin A1C   Date Value Ref Range Status   07/08/2020 9.0 (H) 4.0 - 5.6 % Final     Comment:     ADA Screening Guidelines:  5.7-6.4%  Consistent with prediabetes  >or=6.5%  Consistent with diabetes  High levels of fetal hemoglobin interfere with the HbA1C  assay. Heterozygous hemoglobin variants (HbS, HgC, etc)do  not significantly interfere with this assay.   However, presence of multiple variants may affect accuracy.     05/29/2020 8.6 (H) 4.0 - 5.6 % Final     Comment:     ADA Screening Guidelines:  5.7-6.4%  Consistent with prediabetes  >or=6.5%  Consistent with diabetes  High levels of fetal hemoglobin interfere with the HbA1C  assay. Heterozygous hemoglobin variants (HbS, HgC, etc)do  not significantly interfere with this assay.   However, presence of multiple variants may affect accuracy.     11/21/2017 6.6 (H) 4.0 - 5.6 % Final     Comment:     According to ADA guidelines, hemoglobin A1c <7.0% represents  optimal control in non-pregnant diabetic patients. Different  metrics may apply to specific patient populations.   Standards of Medical Care in Diabetes-2016.  For the purpose of screening for the presence of  diabetes:  <5.7%     Consistent with the absence of diabetes  5.7-6.4%  Consistent with increasing risk for diabetes   (prediabetes)  >or=6.5%  Consistent with diabetes  Currently, no consensus exists for use of hemoglobin A1c  for diagnosis of diabetes for children.  This Hemoglobin A1c assay has significant interference with fetal   hemoglobin   (HbF). The results are invalid for patients with abnormal amounts of   HbF,   including those with known Hereditary Persistence   of Fetal Hemoglobin. Heterozygous hemoglobin variants (HbAS, HbAC,   HbAD, HbAE, HbA2) do not significantly interfere with this assay;   however, presence of multiple variants in a sample may impact the %   interference.                Passed - Patient is at least 18 years old        Passed - Office visit in past 12 months or future 90 days.     Recent Outpatient Visits            1 week ago Type 2 diabetes, uncontrolled, with neuropathy    [place] - Endo/Diabetes Maria Rodrigues NP    1 month ago Type 2 diabetes, uncontrolled, with neuropathy    [place] - Endo/Diabetes Maria Rodrigues NP    1 month ago Mild nonproliferative diabetic retinopathy of both eyes without macular edema associated with type 2 diabetes mellitus    [place] - Optometry Yvonne Vo, OD    2 months ago Routine physical examination    [place] - Family Medicine Zaire Rodrigues MD    1 year ago Mild nonproliferative diabetic retinopathy of both eyes without macular edema associated with type 2 diabetes mellitus    [place] - Optometry Yvonne Vo, OD          Future Appointments              In 3 weeks Maria Rodrigues NP [place] - Endo/Diabetes, [facility] [place]                Passed - eGFR is 30 or above and within 360 days     eGFR if non    Date Value Ref Range Status   07/08/2020 >60.0 >60 mL/min/1.73 m^2 Final     Comment:     Calculation used to obtain the estimated glomerular filtration  rate (eGFR) is the CKD-EPI equation.      05/29/2020 >60.0 >60 mL/min/1.73  m^2 Final     Comment:     Calculation used to obtain the estimated glomerular filtration  rate (eGFR) is the CKD-EPI equation.      11/21/2017 >60.0 >60 mL/min/1.73 m^2 Final     Comment:     Calculation used to obtain the estimated glomerular filtration  rate (eGFR) is the CKD-EPI equation.        eGFR if    Date Value Ref Range Status   07/08/2020 >60.0 >60 mL/min/1.73 m^2 Final   05/29/2020 >60.0 >60 mL/min/1.73 m^2 Final   11/21/2017 >60.0 >60 mL/min/1.73 m^2 Final              Passed - Cr is 1.3 or below and within 360 days     Creatinine   Date Value Ref Range Status   07/08/2020 1.2 0.5 - 1.4 mg/dL Final   05/29/2020 1.1 0.5 - 1.4 mg/dL Final   11/21/2017 0.9 0.5 - 1.4 mg/dL Final                NOVOLOG FLEXPEN U-100 INSULIN 100 unit/mL (3 mL) InPn pen 90 mL 0     Sig: Inject 35 Units into the skin 3 (three) times daily before meals.       Endocrinology:  Diabetes - Insulins Failed - 8/28/2020  9:21 AM        Failed - HBA1C is 7.9 or below and within 180 days     Hemoglobin A1C   Date Value Ref Range Status   07/08/2020 9.0 (H) 4.0 - 5.6 % Final     Comment:     ADA Screening Guidelines:  5.7-6.4%  Consistent with prediabetes  >or=6.5%  Consistent with diabetes  High levels of fetal hemoglobin interfere with the HbA1C  assay. Heterozygous hemoglobin variants (HbS, HgC, etc)do  not significantly interfere with this assay.   However, presence of multiple variants may affect accuracy.     05/29/2020 8.6 (H) 4.0 - 5.6 % Final     Comment:     ADA Screening Guidelines:  5.7-6.4%  Consistent with prediabetes  >or=6.5%  Consistent with diabetes  High levels of fetal hemoglobin interfere with the HbA1C  assay. Heterozygous hemoglobin variants (HbS, HgC, etc)do  not significantly interfere with this assay.   However, presence of multiple variants may affect accuracy.     11/21/2017 6.6 (H) 4.0 - 5.6 % Final     Comment:     According to ADA guidelines, hemoglobin A1c <7.0% represents  optimal control  in non-pregnant diabetic patients. Different  metrics may apply to specific patient populations.   Standards of Medical Care in Diabetes-2016.  For the purpose of screening for the presence of diabetes:  <5.7%     Consistent with the absence of diabetes  5.7-6.4%  Consistent with increasing risk for diabetes   (prediabetes)  >or=6.5%  Consistent with diabetes  Currently, no consensus exists for use of hemoglobin A1c  for diagnosis of diabetes for children.  This Hemoglobin A1c assay has significant interference with fetal   hemoglobin   (HbF). The results are invalid for patients with abnormal amounts of   HbF,   including those with known Hereditary Persistence   of Fetal Hemoglobin. Heterozygous hemoglobin variants (HbAS, HbAC,   HbAD, HbAE, HbA2) do not significantly interfere with this assay;   however, presence of multiple variants in a sample may impact the %   interference.                Passed - Patient is at least 18 years old        Passed - Office visit in past 12 months or future 90 days.     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 2 diabetes mellitus    [place] - Optometry Yvonne Vo, OD    2 months ago Routine physical examination    [place] - Family Medicine Zaire Rodrigues MD    1 year ago Mild nonproliferative diabetic retinopathy of both eyes without macular edema associated with type 2 diabetes mellitus    [place] - Optometry Yvonne Vo, OD          Future Appointments              In 3 weeks Maria Rodrigues NP [place] - Endo/Diabetes, [facility] [place]                Passed - eGFR is 30 or above and within 360 days     eGFR if non    Date Value Ref Range Status    07/08/2020 >60.0 >60 mL/min/1.73 m^2 Final     Comment:     Calculation used to obtain the estimated glomerular filtration  rate (eGFR) is the CKD-EPI equation.      05/29/2020 >60.0 >60 mL/min/1.73 m^2 Final     Comment:     Calculation used to obtain the estimated glomerular filtration  rate (eGFR) is the CKD-EPI equation.      11/21/2017 >60.0 >60 mL/min/1.73 m^2 Final     Comment:     Calculation used to obtain the estimated glomerular filtration  rate (eGFR) is the CKD-EPI equation.        eGFR if    Date Value Ref Range Status   07/08/2020 >60.0 >60 mL/min/1.73 m^2 Final   05/29/2020 >60.0 >60 mL/min/1.73 m^2 Final   11/21/2017 >60.0 >60 mL/min/1.73 m^2 Final              Passed - Cr is 1.3 or below and within 360 days     Creatinine   Date Value Ref Range Status   07/08/2020 1.2 0.5 - 1.4 mg/dL Final   05/29/2020 1.1 0.5 - 1.4 mg/dL Final   11/21/2017 0.9 0.5 - 1.4 mg/dL Final                  Appointments  past 12m or future 3m with PCP    Date Provider   Last Visit   6/10/2020 Zaire Rodrigues MD   Next Visit   Visit date not found Zaire Rodrigues MD   ED visits in past 90 days: 0     Note composed:4:43 PM 08/28/2020

## 2020-08-28 NOTE — TELEPHONE ENCOUNTER
Care Due:                  Date            Visit Type   Department     Provider  --------------------------------------------------------------------------------                                             [facility] FAM[facility]                              ESTABLISHED   MED/ INTERNAL  Last Visit: 06-      PATIENT      MED/ PEDS      MERYL BARNARD  Next Visit: None Scheduled  None         None Found                                                            Last  Test          Frequency    Reason                     Performed    Due Date  --------------------------------------------------------------------------------    Uric Acid...  12 months..  allopurinoL..............  Not Found    Overdue    Powered by [employer]. Reference number: [id number]. 8/28/2020 9:20:47 AM CDT

## 2020-09-21 ENCOUNTER — PATIENT MESSAGE (OUTPATIENT)
Dept: ENDOCRINOLOGY | Facility: CLINIC | Age: 60
End: 2020-09-21

## 2020-09-22 ENCOUNTER — OFFICE VISIT (OUTPATIENT)
Dept: ENDOCRINOLOGY | Facility: CLINIC | Age: 60
End: 2020-09-22
Payer: COMMERCIAL

## 2020-09-22 ENCOUNTER — TELEPHONE (OUTPATIENT)
Dept: ENDOCRINOLOGY | Facility: CLINIC | Age: 60
End: 2020-09-22

## 2020-09-22 PROCEDURE — 3052F PR MOST RECENT HEMOGLOBIN A1C LEVEL 8.0 - < 9.0%: ICD-10-PCS | Mod: CPTII,,, | Performed by: NURSE PRACTITIONER

## 2020-09-22 PROCEDURE — 99213 PR OFFICE/OUTPT VISIT, EST, LEVL III, 20-29 MIN: ICD-10-PCS | Mod: 95,,, | Performed by: NURSE PRACTITIONER

## 2020-09-22 PROCEDURE — 99213 OFFICE O/P EST LOW 20 MIN: CPT | Mod: 95,,, | Performed by: NURSE PRACTITIONER

## 2020-09-22 PROCEDURE — 3052F HG A1C>EQUAL 8.0%<EQUAL 9.0%: CPT | Mod: CPTII,,, | Performed by: NURSE PRACTITIONER

## 2020-09-22 RX ORDER — EMPAGLIFLOZIN 10 MG/1
10 TABLET, FILM COATED ORAL DAILY
Qty: 30 TABLET | Refills: 3 | Status: SHIPPED | OUTPATIENT
Start: 2020-09-22 | End: 2020-12-09 | Stop reason: SDUPTHER

## 2020-09-22 NOTE — PATIENT INSTRUCTIONS
Start Jardiance at 10 mg once daily in the AM.   Continue current insulin doses - will hold off on increasing insulin dose until after starting Jardiance if needed.   Continue metformin and Trulicity.   Test glucose 4x/day.   Follow up in 2 months with labs prior. Send a glucose log in 2 weeks.     jardiance - Drink at least 2 liters of water to avoid dehydration. Side effects include yeast infection, UTI, and lightheadedness. May lower blood pressure a few points. May lose some weight. Stop 2 days  prior to surgery and resume 2 days later. Go to ER if nausea and vomiting. Cannot go on no carbohydrate diet. We expect there to be positive glucose in your urine samples now.

## 2020-09-22 NOTE — TELEPHONE ENCOUNTER
Tried contacting the pt to schedule hie 2 month follow up appointment with labs the week prior. The pt did not answer LVM to return call.

## 2020-09-22 NOTE — PROGRESS NOTES
The patient location is: home  The chief complaint leading to consultation is: type 2 diabetes    Visit type: audiovisual    Face to Face time with patient: 13 minutes of total time spent on the encounter, which includes face to face time and non-face to face time preparing to see the patient (eg, review of tests), Obtaining and/or reviewing separately obtained history, Documenting clinical information in the electronic or other health record, Independently interpreting results (not separately reported) and communicating results to the patient/family/caregiver, or Care coordination (not separately reported).         Each patient to whom he or she provides medical services by telemedicine is:  (1) informed of the relationship between the physician and patient and the respective role of any other health care provider with respect to management of the patient; and (2) notified that he or she may decline to receive medical services by telemedicine and may withdraw from such care at any time.    Notes:       CC: This 60 y.o. White male  is here for evaluation of  T2DM along with comorbidities indicated in the Visit Diagnosis section of this encounter.    HPI: Connor Zuluaga was diagnosed with T2DM in his early 40s. Metformin started at the time of diagnosis.     Initial visit 7/16/20  New to Endocrine. Referred by Dr. Zaire Rodrigues.   He was taking Lantus 85 units hs and Humalog 35 units ac. BGs kevin when Humalog was reduced per Dr. Rodrigues. Trulicity added shortly after at 0.75 mg. He increased dose to 1.5 mg last Saturday.   He has lost 7 lb. Appetite is lower and not snacking as much on Trulicity. No nausea. He was snacking a lot during stay-at-home order.   Smokes 1 ppd. Not ready to stop smoking.  Plan Check glucose before each meal and bedtime and anytime you feel your glucose is too low. Monitor glucose every 8 hours at least to continuous glucose reading with Freestyle Chyna. Look into Skin Tac and Simpatch to  "help with adhesion.   Start exercise regimen - try walking every morning.   Declines visit with dietician. He will try to improve diet. Advised him to start meal planning and start cooking his own healthy meals, with moderate carbohydrate intake.   Continue current meds.   f/u in 4 weeks with virtual visit.     Prior visit 8/2020  He has tried Chyna sensors but he's had one sensor fall off and another one that didn't work. Insurance also doesn't cover it.   Trying to eat healthier with smaller food portions. Thinks he lost a few pounds.   Plan Best to eat 3 balanced meals with small amount of carbs as well as nonstarchy fiber and protein.   Unable to adjust insulin doses without glucose logs.   Best to test before meals and bedtime, 4x/day.   Improve diet and ideally start exercise. Pt admits unlikely to start exercising.  Pt would like to follow up in a month. Will do a virtual visit. He will send a message with log before visit.       Interval history  Pt returns for bg log review. He sent in BG log yesterday through a message.    LAST DIABETES EDUCATION: dietician visit at [facility]     HOSPITALIZED FOR DIABETES -  No.    PRESCRIBED DIABETES MEDICATIONS: metformin 1000 mg bid, Trulicity 1.5 mg once weekly, Lantus Solostar 40 units hs ~ 9 pm, Novolog Flexpen 17 units TID ac     Misses medication doses - No    DM COMPLICATIONS: nephropathy, retinopathy and peripheral neuropathy    SIGNIFICANT DIABETES MED HISTORY: denies intolerance to prior DM meds     SELF MONITORING BLOOD GLUCOSE: Checks blood glucose at home 4x/day.    See media for BG log.   Average glucose 164.     HYPOGLYCEMIC EPISODES: none    CURRENT DIET: trying to cut back on diet coke. Drinks water and a few beers on the weekend sometimes. Eats 3 meals/day with at least 2 snacks a day. Snacks - nuts, peanut butter crackers. No fruit. Admits diet could be better.       CURRENT EXERCISE: none -  "I'm lazy."     SOCIAL:  working from " home currently           There were no vitals taken for this visit.      ROS:         PHYSICAL EXAM:  GENERAL: Well developed, well nourished. No acute distress.   PSYCH: AAOx3, appropriate mood and affect, conversant, well-groomed. Judgement and insight good.   NEURO: Cranial nerves grossly intact. Speech clear, no tremor.       Hemoglobin A1C   Date Value Ref Range Status   07/08/2020 9.0 (H) 4.0 - 5.6 % Final     Comment:     ADA Screening Guidelines:  5.7-6.4%  Consistent with prediabetes  >or=6.5%  Consistent with diabetes  High levels of fetal hemoglobin interfere with the HbA1C  assay. Heterozygous hemoglobin variants (HbS, HgC, etc)do  not significantly interfere with this assay.   However, presence of multiple variants may affect accuracy.     05/29/2020 8.6 (H) 4.0 - 5.6 % Final     Comment:     ADA Screening Guidelines:  5.7-6.4%  Consistent with prediabetes  >or=6.5%  Consistent with diabetes  High levels of fetal hemoglobin interfere with the HbA1C  assay. Heterozygous hemoglobin variants (HbS, HgC, etc)do  not significantly interfere with this assay.   However, presence of multiple variants may affect accuracy.     11/21/2017 6.6 (H) 4.0 - 5.6 % Final     Comment:     According to ADA guidelines, hemoglobin A1c <7.0% represents  optimal control in non-pregnant diabetic patients. Different  metrics may apply to specific patient populations.   Standards of Medical Care in Diabetes-2016.  For the purpose of screening for the presence of diabetes:  <5.7%     Consistent with the absence of diabetes  5.7-6.4%  Consistent with increasing risk for diabetes   (prediabetes)  >or=6.5%  Consistent with diabetes  Currently, no consensus exists for use of hemoglobin A1c  for diagnosis of diabetes for children.  This Hemoglobin A1c assay has significant interference with fetal   hemoglobin   (HbF). The results are invalid for patients with abnormal amounts of   HbF,   including those with known Hereditary Persistence    of Fetal Hemoglobin. Heterozygous hemoglobin variants (HbAS, HbAC,   HbAD, HbAE, HbA2) do not significantly interfere with this assay;   however, presence of multiple variants in a sample may impact the %   interference.             Chemistry        Component Value Date/Time     07/08/2020 1348    K 4.1 07/08/2020 1348    CL 97 07/08/2020 1348    CO2 28 07/08/2020 1348    BUN 18 07/08/2020 1348    CREATININE 1.2 07/08/2020 1348     (H) 07/08/2020 1348        Component Value Date/Time    CALCIUM 9.7 07/08/2020 1348    ALKPHOS 77 07/08/2020 1348    AST 22 07/08/2020 1348    ALT 31 07/08/2020 1348    BILITOT 0.8 07/08/2020 1348    ESTGFRAFRICA >60.0 07/08/2020 1348    EGFRNONAA >60.0 07/08/2020 1348          Lab Results   Component Value Date    LDLCALC 96.4 05/29/2020        Ref. Range 5/29/2020 10:20   Cholesterol Latest Ref Range: 120 - 199 mg/dL 159   HDL Latest Ref Range: 40 - 75 mg/dL 39 (L)   Hdl/Cholesterol Ratio Latest Ref Range: 20.0 - 50.0 % 24.5   LDL Cholesterol External Latest Ref Range: 63.0 - 159.0 mg/dL 96.4   Non-HDL Cholesterol Latest Units: mg/dL 120   Total Cholesterol/HDL Ratio Latest Ref Range: 2.0 - 5.0  4.1   Triglycerides Latest Ref Range: 30 - 150 mg/dL 118     Lab Results   Component Value Date    MICALBCREAT 555.0 (H) 05/29/2020         STANDARDS of CARE:        ASA:               Last eye exam: 7/2020      ASSESSMENT and PLAN:    A1C GOAL: < 7 %     1. Type 2 diabetes, uncontrolled, with neuropathy  Start Jardiance at 10 mg once daily in the AM.   Continue current insulin doses - will hold off on increasing insulin dose until after starting Jardiance if needed.   Continue metformin and Trulicity.   Test glucose 4x/day.   Follow up in 2 months with labs prior. Send a glucose log in 2 weeks.     Hemoglobin A1C       Orders Placed This Encounter   Procedures    Hemoglobin A1C     Standing Status:   Future     Standing Expiration Date:   11/21/2021        Follow up in about 2  months (around 11/22/2020).

## 2020-10-22 DIAGNOSIS — K21.9 GASTROESOPHAGEAL REFLUX DISEASE WITHOUT ESOPHAGITIS: ICD-10-CM

## 2020-10-22 DIAGNOSIS — I10 ESSENTIAL HYPERTENSION: ICD-10-CM

## 2020-10-22 DIAGNOSIS — M10.00 IDIOPATHIC GOUT, UNSPECIFIED CHRONICITY, UNSPECIFIED SITE: ICD-10-CM

## 2020-10-22 RX ORDER — AMLODIPINE BESYLATE 10 MG/1
10 TABLET ORAL DAILY
Qty: 90 TABLET | Refills: 2 | Status: SHIPPED | OUTPATIENT
Start: 2020-10-22 | End: 2021-05-14

## 2020-10-22 RX ORDER — METFORMIN HYDROCHLORIDE 1000 MG/1
TABLET ORAL
Qty: 180 TABLET | Refills: 3 | Status: SHIPPED | OUTPATIENT
Start: 2020-10-22 | End: 2021-03-09

## 2020-10-22 RX ORDER — OMEPRAZOLE 40 MG/1
CAPSULE, DELAYED RELEASE ORAL
Qty: 90 CAPSULE | Refills: 2 | Status: SHIPPED | OUTPATIENT
Start: 2020-10-22 | End: 2021-05-14

## 2020-10-22 NOTE — TELEPHONE ENCOUNTER
Care Due:                  Date            Visit Type   Department     Provider  --------------------------------------------------------------------------------                                             [facility]                              ESTABLISHED   MED/ INTERNAL  Last Visit: 06-      PATIENT      MED/ PEDS      MERYL BARNARD  Next Visit: None Scheduled  None         None Found                                                            Last  Test          Frequency    Reason                     Performed    Due Date  --------------------------------------------------------------------------------    Cr..........  6 months...  atenoloL-chlorthalidone..  07- 01-    HBA1C.......  6 months...  LANTUS, NOVOLOG,           07- 01-                             dulaglutide..............    K...........  6 months...  atenoloL-chlorthalidone..  07- 01-    Na..........  6 months...  atenoloL-chlorthalidone..  07- 01-    Powered by [employer]. Reference number: [id number]. 10/22/2020 9:53:42 AM   CDT

## 2020-10-23 RX ORDER — ALLOPURINOL 100 MG/1
100 TABLET ORAL DAILY
Qty: 90 TABLET | Refills: 0 | Status: SHIPPED | OUTPATIENT
Start: 2020-10-23 | End: 2021-01-21

## 2020-10-23 NOTE — PROGRESS NOTES
Refill Routing Note   Medication(s) are not appropriate for processing by [facility] for the following reason(s):     - Required laboratory values are outdated >6 months    [facility] actions taken in this encounter:   Defer  Approve    Medication-related problems identified: Requires labs  Medication Therapy Plan: CDMR. LABS: (URIC ACID); Labs outdated >6 months; Defer allopurinol; Approve omeprazole and amlodipine  Medication reconciliation completed: No   Automatic Epic Generated Protocol Data:    Orders Placed This Encounter    Hemoglobin A1C    Uric Acid    amLODIPine (NORVASC) 10 MG tablet    omeprazole (PRILOSEC) 40 MG capsule      Requested Prescriptions   Pending Prescriptions Disp Refills    allopurinoL (ZYLOPRIM) 100 MG tablet [Pharmacy Med Name: allopurinol 100 mg tablet] 90 tablet 0     Sig: Take 1 tablet (100 mg total) by mouth once daily.       Endocrinology:  Gout Agents - allopurinol Failed - 10/22/2020  9:53 AM        Failed - Uric Acid within 360 days     No results found for: EXTURICACID, POCURA, URICACID, LABURIC           Passed - Patient is at least 18 years old        Passed - Office Visit within last 12 months or future 90 days.     Recent Outpatient Visits            1 month ago Type 2 diabetes, uncontrolled, with neuropathy    [place] - Endo/Diabetes Maria Rodrigues NP    2 months ago Type 2 diabetes, uncontrolled, with neuropathy    [place] - Endo/Diabetes Maria Rodrigues NP    3 months ago Type 2 diabetes, uncontrolled, with neuropathy    [place] - Endo/Diabetes Maria Rodrigues NP    3 months ago Mild nonproliferative diabetic retinopathy of both eyes without macular edema associated with type 2 diabetes mellitus    [place] - Optometry Yvonne Vo, OD    4 months ago Routine physical examination    [place] - Family Medicine Zaire Rodrigues MD          Future Appointments              In 1 month LAB, [facility] DRAW STATION [facility] [place]    In   month [facility] [place] [facility]                Passed - Cr is 1.3 or below and within 360 days     Creatinine   Date Value Ref Range Status   07/08/2020 1.2 0.5 - 1.4 mg/dL Final   05/29/2020 1.1 0.5 - 1.4 mg/dL Final   11/21/2017 0.9 0.5 - 1.4 mg/dL Final              Passed - WBC within 360 days     WBC   Date Value Ref Range Status   05/29/2020 10.31 3.90 - 12.70 K/uL Final   11/21/2017 8.68 3.90 - 12.70 K/uL Final   05/20/2016 8.57 3.90 - 12.70 K/uL Final              Passed - RBC within 360 days     RBC   Date Value Ref Range Status   05/29/2020 5.20 4.60 - 6.20 M/uL Final   11/21/2017 4.80 4.60 - 6.20 M/uL Final   05/20/2016 4.90 4.60 - 6.20 M/uL Final              Passed - HGB within 360 days     Hemoglobin   Date Value Ref Range Status   05/29/2020 15.9 14.0 - 18.0 g/dL Final   11/21/2017 14.5 14.0 - 18.0 g/dL Final   05/20/2016 15.5 14.0 - 18.0 g/dL Final              Passed - HCT within 360 days     Hematocrit   Date Value Ref Range Status   05/29/2020 49.7 40.0 - 54.0 % Final   11/21/2017 43.6 40.0 - 54.0 % Final   05/20/2016 46.0 40.0 - 54.0 % Final              Passed - PLT within 360 days     Platelets   Date Value Ref Range Status   05/29/2020 226 150 - 350 K/uL Final   11/21/2017 246 150 - 350 K/uL Final   05/20/2016 179 150 - 350 K/uL Final              Passed - ALT completed     ALT   Date Value Ref Range Status   07/08/2020 31 10 - 44 U/L Final   05/29/2020 32 10 - 44 U/L Final   11/21/2017 32 10 - 44 U/L Final              Passed - AST is 54 or below and within 360 days     AST   Date Value Ref Range Status   07/08/2020 22 10 - 40 U/L Final   05/29/2020 22 10 - 40 U/L Final   11/21/2017 24 10 - 40 U/L Final              Passed - eGFR is 60 or above and within 360 days     eGFR if non    Date Value Ref Range Status   07/08/2020 >60.0 >60 mL/min/1.73 m^2 Final     Comment:     Calculation used to obtain the estimated glomerular filtration  rate (eGFR)  is the CKD-EPI equation.      05/29/2020 >60.0 >60 mL/min/1.73 m^2 Final     Comment:     Calculation used to obtain the estimated glomerular filtration  rate (eGFR) is the CKD-EPI equation.      11/21/2017 >60.0 >60 mL/min/1.73 m^2 Final     Comment:     Calculation used to obtain the estimated glomerular filtration  rate (eGFR) is the CKD-EPI equation.        eGFR if    Date Value Ref Range Status   07/08/2020 >60.0 >60 mL/min/1.73 m^2 Final   05/29/2020 >60.0 >60 mL/min/1.73 m^2 Final   11/21/2017 >60.0 >60 mL/min/1.73 m^2 Final               Signed Prescriptions Disp Refills    amLODIPine (NORVASC) 10 MG tablet 90 tablet 2     Sig: Take 1 tablet (10 mg total) by mouth once daily.       Cardiovascular:  Calcium Channel Blockers Passed - 10/22/2020  9:53 AM        Passed - Patient is at least 18 years old        Passed - Last BP in normal range within 360 days     BP Readings from Last 3 Encounters:   07/16/20 134/74   06/10/20 122/64   11/21/18 132/82              Passed - Office Visit within last 12 months or future 90 days.     Recent Outpatient Visits            1 month ago Type 2 diabetes, uncontrolled, with neuropathy    [place] - Endo/Diabetes Maria Rodrigues NP    2 months ago Type 2 diabetes, uncontrolled, with neuropathy    [place] - Endo/Diabetes Maria Rodrigues NP    3 months ago Type 2 diabetes, uncontrolled, with neuropathy    [place] - Endo/Diabetes Maria Rodrigues NP    3 months ago Mild nonproliferative diabetic retinopathy of both eyes without macular edema associated with type 2 diabetes mellitus    [place] - Optometry Yvonne Vo, OD    4 months ago Routine physical examination    [place] - Family Medicine Zaire Rodrigues MD          Future Appointments              In 1 month LAB, [facility] DRAW STATION [facility] [place] - [place]    In 1 month Maria Rodrigues NP [place] - Endo/Diabetes, [facility] [place]                  omeprazole (PRILOSEC) 40 MG capsule 90  capsule 2     Sig: TAKE ONE CAPSULE BY MOUTH IN THE MORNING BEFORE BREAKFAST DAILY       Gastroenterology: Proton Pump Inhibitors Passed - 10/22/2020  9:53 AM        Passed - Patient is at least 18 years old        Passed - Osteoporosis is not on problem list        Passed - Plavix is not on active medication list        Passed - Office Visit within last 12 months or future 90 days.     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 with type 2 diabetes mellitus    [place] - Optometry Yvonne Vo, OD    4 months ago Routine physical examination    [place] - Family Medicine Zaire Rodrigues MD          Future Appointments              In 1 month LAB, [facility] DRAW STATION [facility], [facility] [place]    In 1 month Maria Rodrigues NP [place] - Endo/Diabetes, [facility] [place]                Passed - GERD is on problem list              Appointments  past 12m or future 3m with PCP    Date Provider   Last Visit   6/10/2020 Zaire Rodrigues MD   Next Visit   Visit date not found Zaire Rodrigues MD   ED visits in past 90 days: 0        Note composed:11:22 PM 10/22/2020

## 2020-10-27 DIAGNOSIS — M10.00 IDIOPATHIC GOUT, UNSPECIFIED CHRONICITY, UNSPECIFIED SITE: Primary | ICD-10-CM

## 2020-10-27 NOTE — TELEPHONE ENCOUNTER
Care Due:                  Date            Visit Type   Department     Provider  --------------------------------------------------------------------------------                                             [facility]                              ESTABLISHED   MED/ INTERNAL  Last Visit: 06-      PATIENT      MED/ PEDS      MERYL BARNARD  Next Visit: None Scheduled  None         None Found                                                            Last  Test          Frequency    Reason                     Performed    Due Date  --------------------------------------------------------------------------------    Uric Acid...  12 months..  allopurinoL..............  Not Found    Overdue    Powered by [employer]. Reference number: [id number]. 10/27/2020 12:38:32 PM   CDT

## 2020-10-28 RX ORDER — INSULIN GLARGINE 100 [IU]/ML
INJECTION, SOLUTION SUBCUTANEOUS
Qty: 50 ML | Refills: 0 | Status: SHIPPED | OUTPATIENT
Start: 2020-10-28 | End: 2020-10-30 | Stop reason: SDUPTHER

## 2020-10-28 NOTE — PROGRESS NOTES
Refill Authorization Note   Connor Zuluaga is requesting a refill authorization.  Brief assessment and rationale for refill: Approve    -Medication-related problems identified: Requires labs  Medication Therapy Plan: CDMR; FLOs; Labs (Uric acid); Approved 3 more    Medication reconciliation completed: No   Comments:   Orders Placed This Encounter    LANTUS U-100 INSULIN 100 unit/mL injection      Requested Prescriptions   Signed Prescriptions Disp Refills    LANTUS U-100 INSULIN 100 unit/mL injection 50 mL 0     Sig: INJECT 85 UNITS UNDER THE SKIN AT BEDTIME       Endocrinology:  Diabetes - Insulins Failed - 10/27/2020 12:38 PM        Failed - HBA1C is 8 or below and within 180 days     Hemoglobin A1C   Date Value Ref Range Status   07/08/2020 9.0 (H) 4.0 - 5.6 % Final     Comment:     ADA Screening Guidelines:  5.7-6.4%  Consistent with prediabetes  >or=6.5%  Consistent with diabetes  High levels of fetal hemoglobin interfere with the HbA1C  assay. Heterozygous hemoglobin variants (HbS, HgC, etc)do  not significantly interfere with this assay.   However, presence of multiple variants may affect accuracy.     05/29/2020 8.6 (H) 4.0 - 5.6 % Final     Comment:     ADA Screening Guidelines:  5.7-6.4%  Consistent with prediabetes  >or=6.5%  Consistent with diabetes  High levels of fetal hemoglobin interfere with the HbA1C  assay. Heterozygous hemoglobin variants (HbS, HgC, etc)do  not significantly interfere with this assay.   However, presence of multiple variants may affect accuracy.     11/21/2017 6.6 (H) 4.0 - 5.6 % Final     Comment:     According to ADA guidelines, hemoglobin A1c <7.0% represents  optimal control in non-pregnant diabetic patients. Different  metrics may apply to specific patient populations.   Standards of Medical Care in Diabetes-2016.  For the purpose of screening for the presence of diabetes:  <5.7%     Consistent with the absence of diabetes  5.7-6.4%  Consistent with increasing risk for  diabetes   (prediabetes)  >or=6.5%  Consistent with diabetes  Currently, no consensus exists for use of hemoglobin A1c  for diagnosis of diabetes for children.  This Hemoglobin A1c assay has significant interference with fetal   hemoglobin   (HbF). The results are invalid for patients with abnormal amounts of   HbF,   including those with known Hereditary Persistence   of Fetal Hemoglobin. Heterozygous hemoglobin variants (HbAS, HbAC,   HbAD, HbAE, HbA2) do not significantly interfere with this assay;   however, presence of multiple variants in a sample may impact the %   interference.                Passed - Patient is at least 18 years old        Passed - Office visit in past 12 months or future 90 days     Recent Outpatient Visits            1 month ago Type 2 diabetes, uncontrolled, with neuropathy    [place] - Endo/Diabetes Maria Rdorigues NP    2 months ago Type 2 diabetes, uncontrolled, with neuropathy    [place] - Endo/Diabetes Maria Rodrigues NP    3 months ago Type 2 diabetes, uncontrolled, with neuropathy    [place] - Endo/Diabetes Maria Rodrigues NP    3 months ago Mild nonproliferative diabetic retinopathy of both eyes without macular edema associated with type 2 diabetes mellitus    [place] - Optometry Yvonne Vo, OD    4 months ago Routine physical examination    [place] - Family Medicine Zaire Rodrigues MD          Future Appointments              In 1 month LAB, [facility] DRAW STATION [facility], [facility] [place]    In 1 month [facility] [place] - Endo/Diabetes, [facility] [place]                Passed - eGFR is 30 or above and within 360 days     eGFR if non    Date Value Ref Range Status   07/08/2020 >60.0 >60 mL/min/1.73 m^2 Final     Comment:     Calculation used to obtain the estimated glomerular filtration  rate (eGFR) is the CKD-EPI equation.      05/29/2020 >60.0 >60 mL/min/1.73 m^2 Final     Comment:     Calculation used to obtain the estimated  glomerular filtration  rate (eGFR) is the CKD-EPI equation.      11/21/2017 >60.0 >60 mL/min/1.73 m^2 Final     Comment:     Calculation used to obtain the estimated glomerular filtration  rate (eGFR) is the CKD-EPI equation.        eGFR if    Date Value Ref Range Status   07/08/2020 >60.0 >60 mL/min/1.73 m^2 Final   05/29/2020 >60.0 >60 mL/min/1.73 m^2 Final   11/21/2017 >60.0 >60 mL/min/1.73 m^2 Final              Passed - Cr is 1.4 or below and within 360 days     Creatinine   Date Value Ref Range Status   07/08/2020 1.2 0.5 - 1.4 mg/dL Final   05/29/2020 1.1 0.5 - 1.4 mg/dL Final   11/21/2017 0.9 0.5 - 1.4 mg/dL Final                  Appointments  past 12m or future 3m with PCP    Date Provider   Last Visit   6/10/2020 Zaire Rodrigues MD   Next Visit   Visit date not found Zaire Rodrigues MD   ED visits in past 90 days: 0     Note composed:6:18 PM 10/28/2020

## 2020-10-28 NOTE — TELEPHONE ENCOUNTER
Provider Staff:     Action is required for this patient.     Please schedule patient for Labs (Uric acid) and link to future lab visit    Thanks!  [facility]     Appointments  past 12m or future 3m with PCP    Date Provider   Last Visit   6/10/2020 Zaire Rodrigues MD   Next Visit   Visit date not found Zaire Rodrigues MD     Note composed:6:18 PM 10/28/2020

## 2020-10-29 NOTE — PROGRESS NOTES
Provider Staff:     Action is required for this patient.     Please schedule patient for Labs (uric acid)    Thanks!  [facility]     Appointments  past 12m or future 3m with PCP    Date Provider   Last Visit   6/10/2020 Zaire Rodrigues MD   Next Visit    Zaire Rodrigues MD     Note composed:7:32 AM 10/29/2020

## 2020-10-30 RX ORDER — INSULIN GLARGINE 100 [IU]/ML
INJECTION, SOLUTION SUBCUTANEOUS
Qty: 50 ML | Refills: 0 | Status: SHIPPED | OUTPATIENT
Start: 2020-10-30 | End: 2020-11-02 | Stop reason: SDUPTHER

## 2020-11-02 ENCOUNTER — LAB VISIT (OUTPATIENT)
Dept: LAB | Facility: HOSPITAL | Age: 60
End: 2020-11-02
Attending: FAMILY MEDICINE
Payer: COMMERCIAL

## 2020-11-02 DIAGNOSIS — M10.00 IDIOPATHIC GOUT, UNSPECIFIED CHRONICITY, UNSPECIFIED SITE: ICD-10-CM

## 2020-11-02 LAB
ESTIMATED AVG GLUCOSE: 151 MG/DL (ref 68–131)
HBA1C MFR BLD HPLC: 6.9 % (ref 4–5.6)
URATE SERPL-MCNC: 7.5 MG/DL (ref 3.4–7)

## 2020-11-02 PROCEDURE — 36415 COLL VENOUS BLD VENIPUNCTURE: CPT | Mod: PO

## 2020-11-02 PROCEDURE — 84550 ASSAY OF BLOOD/URIC ACID: CPT

## 2020-11-02 PROCEDURE — 83036 HEMOGLOBIN GLYCOSYLATED A1C: CPT

## 2020-11-02 RX ORDER — INSULIN ASPART 100 [IU]/ML
INJECTION, SOLUTION INTRAVENOUS; SUBCUTANEOUS
Qty: 90 ML | Refills: 0 | Status: SHIPPED | OUTPATIENT
Start: 2020-11-02 | End: 2020-12-09 | Stop reason: SDUPTHER

## 2020-11-02 RX ORDER — INSULIN GLARGINE 100 [IU]/ML
INJECTION, SOLUTION SUBCUTANEOUS
Qty: 50 ML | Refills: 0 | Status: SHIPPED | OUTPATIENT
Start: 2020-11-02 | End: 2020-11-30

## 2020-11-16 ENCOUNTER — OFFICE VISIT (OUTPATIENT)
Dept: FAMILY MEDICINE | Facility: CLINIC | Age: 60
End: 2020-11-16
Payer: COMMERCIAL

## 2020-11-16 VITALS
OXYGEN SATURATION: 98 % | WEIGHT: 246.38 LBS | DIASTOLIC BLOOD PRESSURE: 62 MMHG | SYSTOLIC BLOOD PRESSURE: 122 MMHG | HEART RATE: 71 BPM | HEIGHT: 71 IN | TEMPERATURE: 98 F | BODY MASS INDEX: 34.49 KG/M2

## 2020-11-16 DIAGNOSIS — E11.3293 MILD NONPROLIFERATIVE DIABETIC RETINOPATHY OF BOTH EYES WITHOUT MACULAR EDEMA ASSOCIATED WITH TYPE 2 DIABETES MELLITUS: Primary | Chronic | ICD-10-CM

## 2020-11-16 DIAGNOSIS — Z23 NEED FOR IMMUNIZATION AGAINST INFLUENZA: ICD-10-CM

## 2020-11-16 DIAGNOSIS — H93.90 EAR LESION: ICD-10-CM

## 2020-11-16 DIAGNOSIS — E78.5 HYPERLIPIDEMIA, UNSPECIFIED HYPERLIPIDEMIA TYPE: ICD-10-CM

## 2020-11-16 DIAGNOSIS — I10 ESSENTIAL HYPERTENSION: Chronic | ICD-10-CM

## 2020-11-16 DIAGNOSIS — Z23 NEED FOR SHINGLES VACCINE: ICD-10-CM

## 2020-11-16 DIAGNOSIS — Z72.0 TOBACCO ABUSE: ICD-10-CM

## 2020-11-16 PROCEDURE — 1126F PR PAIN SEVERITY QUANTIFIED, NO PAIN PRESENT: ICD-10-PCS | Mod: S$GLB,,, | Performed by: FAMILY MEDICINE

## 2020-11-16 PROCEDURE — 99214 OFFICE O/P EST MOD 30 MIN: CPT | Mod: 25,S$GLB,, | Performed by: FAMILY MEDICINE

## 2020-11-16 PROCEDURE — 3078F PR MOST RECENT DIASTOLIC BLOOD PRESSURE < 80 MM HG: ICD-10-PCS | Mod: CPTII,S$GLB,, | Performed by: FAMILY MEDICINE

## 2020-11-16 PROCEDURE — 90471 FLU VACCINE (QUAD) GREATER THAN OR EQUAL TO 3YO PRESERVATIVE FREE IM: ICD-10-PCS | Mod: S$GLB,,, | Performed by: FAMILY MEDICINE

## 2020-11-16 PROCEDURE — 99999 PR PBB SHADOW E&M-EST. PATIENT-LVL IV: ICD-10-PCS | Mod: PBBFAC,,, | Performed by: FAMILY MEDICINE

## 2020-11-16 PROCEDURE — 3008F PR BODY MASS INDEX (BMI) DOCUMENTED: ICD-10-PCS | Mod: CPTII,S$GLB,, | Performed by: FAMILY MEDICINE

## 2020-11-16 PROCEDURE — 3078F DIAST BP <80 MM HG: CPT | Mod: CPTII,S$GLB,, | Performed by: FAMILY MEDICINE

## 2020-11-16 PROCEDURE — 3074F SYST BP LT 130 MM HG: CPT | Mod: CPTII,S$GLB,, | Performed by: FAMILY MEDICINE

## 2020-11-16 PROCEDURE — 90471 IMMUNIZATION ADMIN: CPT | Mod: S$GLB,,, | Performed by: FAMILY MEDICINE

## 2020-11-16 PROCEDURE — 99999 PR PBB SHADOW E&M-EST. PATIENT-LVL IV: CPT | Mod: PBBFAC,,, | Performed by: FAMILY MEDICINE

## 2020-11-16 PROCEDURE — 3044F HG A1C LEVEL LT 7.0%: CPT | Mod: CPTII,S$GLB,, | Performed by: FAMILY MEDICINE

## 2020-11-16 PROCEDURE — 90750 ZOSTER RECOMBINANT VACCINE: ICD-10-PCS | Mod: S$GLB,,, | Performed by: FAMILY MEDICINE

## 2020-11-16 PROCEDURE — 1126F AMNT PAIN NOTED NONE PRSNT: CPT | Mod: S$GLB,,, | Performed by: FAMILY MEDICINE

## 2020-11-16 PROCEDURE — 90686 FLU VACCINE (QUAD) GREATER THAN OR EQUAL TO 3YO PRESERVATIVE FREE IM: ICD-10-PCS | Mod: S$GLB,,, | Performed by: FAMILY MEDICINE

## 2020-11-16 PROCEDURE — 90750 HZV VACC RECOMBINANT IM: CPT | Mod: S$GLB,,, | Performed by: FAMILY MEDICINE

## 2020-11-16 PROCEDURE — 3044F PR MOST RECENT HEMOGLOBIN A1C LEVEL <7.0%: ICD-10-PCS | Mod: CPTII,S$GLB,, | Performed by: FAMILY MEDICINE

## 2020-11-16 PROCEDURE — 90472 ZOSTER RECOMBINANT VACCINE: ICD-10-PCS | Mod: S$GLB,,, | Performed by: FAMILY MEDICINE

## 2020-11-16 PROCEDURE — 90472 IMMUNIZATION ADMIN EACH ADD: CPT | Mod: S$GLB,,, | Performed by: FAMILY MEDICINE

## 2020-11-16 PROCEDURE — 99214 PR OFFICE/OUTPT VISIT, EST, LEVL IV, 30-39 MIN: ICD-10-PCS | Mod: 25,S$GLB,, | Performed by: FAMILY MEDICINE

## 2020-11-16 PROCEDURE — 3008F BODY MASS INDEX DOCD: CPT | Mod: CPTII,S$GLB,, | Performed by: FAMILY MEDICINE

## 2020-11-16 PROCEDURE — 90686 IIV4 VACC NO PRSV 0.5 ML IM: CPT | Mod: S$GLB,,, | Performed by: FAMILY MEDICINE

## 2020-11-16 PROCEDURE — 3074F PR MOST RECENT SYSTOLIC BLOOD PRESSURE < 130 MM HG: ICD-10-PCS | Mod: CPTII,S$GLB,, | Performed by: FAMILY MEDICINE

## 2020-11-16 NOTE — PROGRESS NOTES
[facility]  Progress Note    SUBJECTIVE:     Chief Complaint   Patient presents with    Hypertension    Diabetes       HPI   Connor Zuluaga  is a 60 y.o. male here for follow-up of his chronic conditions. Takes meds as directed. Patient has no other new complaints/problems at this time.      Review of patient's allergies indicates:  No Known Allergies    Past Medical History:   Diagnosis Date    Diabetes mellitus, type 2     Gout     Hyperlipidemia     Hypertension     Mild nonproliferative diabetic retinopathy of both eyes without macular edema associated with type 2 diabetes mellitus 11/21/2018     Past Surgical History:   Procedure Laterality Date    RETINAL LASER PROCEDURE Left 5 yrs    TONSILLECTOMY       Family History   Problem Relation Age of Onset    Hyperlipidemia Mother     Hypertension Mother     Diabetes Mother     Cataracts Mother     Hyperlipidemia Father     Hypertension Father     Diabetes Father     Cancer Father         prostate, skin cancer, polyps in intestine    No Known Problems Sister     No Known Problems Brother     No Known Problems Maternal Aunt     No Known Problems Maternal Uncle     No Known Problems Paternal Aunt     No Known Problems Paternal Uncle     No Known Problems Maternal Grandmother     No Known Problems Maternal Grandfather     No Known Problems Paternal Grandmother     No Known Problems Paternal Grandfather     Amblyopia Neg Hx     Blindness Neg Hx     Glaucoma Neg Hx     Macular degeneration Neg Hx     Retinal detachment Neg Hx     Strabismus Neg Hx     Stroke Neg Hx     Thyroid disease Neg Hx      Social History     Tobacco Use    Smoking status: Current Every Day Smoker     Packs/day: 1.00    Smokeless tobacco: Never Used   Substance Use Topics    Alcohol use: Yes    Drug use: No        Review of Systems   Constitutional: Negative for chills, fever and malaise/fatigue.   HENT: Negative.    Respiratory: Negative.   Negative for cough and shortness of breath.    Cardiovascular: Negative.  Negative for chest pain.   Gastrointestinal: Negative.  Negative for abdominal pain, nausea and vomiting.   Genitourinary: Negative.    Neurological: Negative for weakness and headaches.   All other systems reviewed and are negative.    OBJECTIVE:     Vitals:    11/16/20 0921   BP: 122/62   Pulse: 71   Temp: 98.2 °F (36.8 °C)     Body mass index is 34.36 kg/m².    Physical Exam   Constitutional: He is oriented to person, place, and time and well-developed, well-nourished, and in no distress. No distress.   HENT:   Head: Normocephalic and atraumatic.   Nose: Nose normal.   Eyes: Conjunctivae and EOM are normal.   Cardiovascular: Normal rate, regular rhythm and normal heart sounds. Exam reveals no gallop and no friction rub.   No murmur heard.  Pulmonary/Chest: Effort normal and breath sounds normal. No respiratory distress. He has no wheezes. He has no rales. He exhibits no tenderness.   Abdominal: Soft. Bowel sounds are normal. He exhibits no distension. There is no abdominal tenderness. There is no rebound.   Neurological: He is alert and oriented to person, place, and time.   Skin: Skin is warm. He is not diaphoretic.       Old records were reviewed. Labs and/or images were independently reviewed.    ASSESSMENT     1. 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 Need for immunization against influenza    3. Need for shingles vaccine    4. Hyperlipidemia, unspecified hyperlipidemia type    5. Essential hypertension    6. Tobacco abuse        PLAN:     Mild nonproliferative diabetic retinopathy of both eyes without macular edema associated with type 2 diabetes mellitus   -     Instructed patient to take daily glucose AM logs and to write them down to bring with on next visit. Advised patient to decrease intake of carbohydrates/simple sugars.         Need for  immunization against influenza  -     Influenza - Quadrivalent (PF)    Need for shingles vaccine  -     Zoster Recombinant Vaccine    Hyperlipidemia, unspecified hyperlipidemia type   -     Counseled patient about healthy diet, exercise habits, and to increase physical activity.    Essential hypertension   -     Stable. Continue current regimen.    Tobacco abuse   -      Will quit on his own.    RTC PRN    Zaire Rodrigues MD  11/16/2020 9:38 AM

## 2020-11-30 RX ORDER — INSULIN GLARGINE 100 [IU]/ML
INJECTION, SOLUTION SUBCUTANEOUS
Qty: 80 ML | Refills: 0 | Status: SHIPPED | OUTPATIENT
Start: 2020-11-30 | End: 2020-12-09

## 2020-11-30 NOTE — TELEPHONE ENCOUNTER
No new care gaps identified.  Powered by [employer]. Reference number: [id number]. 11/30/2020 10:26:45 AM   CST

## 2020-11-30 NOTE — PROGRESS NOTES
Refill Authorization Note   Connor Zuluaga is requesting a refill authorization.  Brief assessment and rationale for refill: Approve     Medication Therapy Plan: CD    Medication reconciliation completed: No   Comments:   Orders Placed This Encounter    LANTUS U-100 INSULIN 100 unit/mL injection      Requested Prescriptions   Signed Prescriptions Disp Refills    LANTUS U-100 INSULIN 100 unit/mL injection 80 mL 0     Sig: INJECT 85 UNITS UNDER THE SKIN AT BEDTIME       Endocrinology:  Diabetes - Insulins Passed - 11/30/2020 10:26 AM        Passed - Patient is at least 18 years old        Passed - Office visit in past 12 months or future 90 days     Recent Outpatient Visits            2 weeks ago Mild nonproliferative diabetic retinopathy of both eyes without macular edema associated with type 2 diabetes mellitus    [place] [facility] Zaire Rodrigues MD    2 months ago Type 2 diabetes, uncontrolled, with neuropathy    [place] - Endo/Diabetes Maria Rodrigues NP    3 months ago Type 2 diabetes, uncontrolled, with neuropathy    [place] [facility] Maria Rodrigues NP    4 months ago Type 2 diabetes, uncontrolled, with neuropathy    [place] [facility]Di[facility] Maria Rodrigues NP    4 months ago Mild nonproliferative diabetic retinopathy of both eyes without macular edema associated with type 2 diabetes mellitus    [place] - Optometry Yvonne Vo, OD          Future Appointments              In 2 days LAB, [facility] DRAW ST[facility] [place] [facility]    In 1 week Maria Rodrigues NP [place] [facility] Endo/Diabetes, [facility] -     In 2 weeks [facility]    In 2 months NURSE, [facility] [place] [facility]                Passed - HBA1C is 8 or below and within 180 days     Hemoglobin A1C   Date Value Ref Range Status   11/02/2020 6.9 (H) 4.0 - 5.6 % Final     Comment:     ADA Screening Guidelines:  5.7-6.4%  Consistent  with prediabetes  >or=6.5%  Consistent with diabetes  High levels of fetal hemoglobin interfere with the HbA1C  assay. Heterozygous hemoglobin variants (HbS, HgC, etc)do  not significantly interfere with this assay.   However, presence of multiple variants may affect accuracy.     07/08/2020 9.0 (H) 4.0 - 5.6 % Final     Comment:     ADA Screening Guidelines:  5.7-6.4%  Consistent with prediabetes  >or=6.5%  Consistent with diabetes  High levels of fetal hemoglobin interfere with the HbA1C  assay. Heterozygous hemoglobin variants (HbS, HgC, etc)do  not significantly interfere with this assay.   However, presence of multiple variants may affect accuracy.     05/29/2020 8.6 (H) 4.0 - 5.6 % Final     Comment:     ADA Screening Guidelines:  5.7-6.4%  Consistent with prediabetes  >or=6.5%  Consistent with diabetes  High levels of fetal hemoglobin interfere with the HbA1C  assay. Heterozygous hemoglobin variants (HbS, HgC, etc)do  not significantly interfere with this assay.   However, presence of multiple variants may affect accuracy.                Passed - eGFR is 30 or above and within 360 days     eGFR if non    Date Value Ref Range Status   07/08/2020 >60.0 >60 mL/min/1.73 m^2 Final     Comment:     Calculation used to obtain the estimated glomerular filtration  rate (eGFR) is the CKD-EPI equation.      05/29/2020 >60.0 >60 mL/min/1.73 m^2 Final     Comment:     Calculation used to obtain the estimated glomerular filtration  rate (eGFR) is the CKD-EPI equation.      11/21/2017 >60.0 >60 mL/min/1.73 m^2 Final     Comment:     Calculation used to obtain the estimated glomerular filtration  rate (eGFR) is the CKD-EPI equation.        eGFR if    Date Value Ref Range Status   07/08/2020 >60.0 >60 mL/min/1.73 m^2 Final   05/29/2020 >60.0 >60 mL/min/1.73 m^2 Final   11/21/2017 >60.0 >60 mL/min/1.73 m^2 Final              Passed - Cr is 1.4 or below and within 360 days     Creatinine   Date  Value Ref Range Status   07/08/2020 1.2 0.5 - 1.4 mg/dL Final   05/29/2020 1.1 0.5 - 1.4 mg/dL Final   11/21/2017 0.9 0.5 - 1.4 mg/dL Final                  Appointments  past 12m or future 3m with PCP    Date Provider   Last Visit   11/16/2020 Zaire Rodrigues MD   Next Visit   Visit date not found Zaire Rodrigues MD   ED visits in past 90 days: 0     Note composed:1:29 PM 11/30/2020

## 2020-12-08 NOTE — PROGRESS NOTES
CC: This 60 y.o. White male  is here for evaluation of  T2DM along with comorbidities indicated in the Visit Diagnosis section of this encounter.    HPI: Connor Zuluaga was diagnosed with T2DM in his early 40s. Metformin started at the time of diagnosis.     Prior visit 8/2020  He has tried Chyna sensors but he's had one sensor fall off and another one that didn't work. Insurance also doesn't cover it.   Trying to eat healthier with smaller food portions. Thinks he lost a few pounds.   Plan Best to eat 3 balanced meals with small amount of carbs as well as nonstarchy fiber and protein.   Unable to adjust insulin doses without glucose logs.   Best to test before meals and bedtime, 4x/day.   Improve diet and ideally start exercise. Pt admits unlikely to start exercising.  Pt would like to follow up in a month. Will do a virtual visit. He will send a message with log before visit.       Prior visit 9/2020 virtual visit   Pt returns for bg log review. He sent in BG log yesterday through a message.  Plan Start Jardiance at 10 mg once daily in the AM.   Continue current insulin doses - will hold off on increasing insulin dose until after starting Jardiance if needed.   Continue metformin and Trulicity.   Test glucose 4x/day.   Follow up in 2 months with labs prior. Send a glucose log in 2 weeks.       Interval history  a1c down from 9% in July to 6.9% in November.   He has started Jardiance 10 mg and denies  side effects. No dizziness.   He's lost 20 lb since June.       Highest BGs are at bedtime - dinner is higher carb and sometimes will also eat bedtime snack which could be nuts or sweets.   LAST DIABETES EDUCATION: dietician visit at [facility]     HOSPITALIZED FOR DIABETES -  No.    PRESCRIBED DIABETES MEDICATIONS: Jardiance 10 mg once daily, metformin 1000 mg bid, Trulicity 1.5 mg once weekly, Lantus Solostar 40 units hs ~ 9 pm, Novolog Flexpen 17 units TID ac     Misses medication doses - No    DM  "COMPLICATIONS: nephropathy, retinopathy and peripheral neuropathy    SIGNIFICANT DIABETES MED HISTORY: denies intolerance to prior DM meds     SELF MONITORING BLOOD GLUCOSE: Checks blood glucose at home 4x/day.    Has used stefany sensors but had trouble with it and his insurance doesn't cover it.   First sensor fell off, then used adhesive aides for his second sensor but it did not work.               HYPOGLYCEMIC EPISODES: symptoms start in the 80s     CURRENT DIET: trying to cut back on diet coke. Drinks water and a few beers on the weekend sometimes. Eats 3 meals/day. Sometimes has a snack after dinner.       CURRENT EXERCISE: none -  "I'm lazy."     SOCIAL:  working from home currently           /69 (BP Location: Right arm, Patient Position: Sitting, BP Method: Large (Automatic))   Pulse 85   Temp 97.7 °F (36.5 °C) (Temporal)   Wt 111.2 kg (245 lb 1.6 oz)   BMI 34.18 kg/m²       ROS:   CONSTITUTIONAL: Appetite good, denies fatigue  : No dysuria      PHYSICAL EXAM:  GENERAL: Well developed, well nourished. No acute distress.   PSYCH: AAOx3, appropriate mood and affect, conversant, well-groomed. Judgement and insight good.   NEURO: Cranial nerves grossly intact. Speech clear, no tremor.       Hemoglobin A1C   Date Value Ref Range Status   11/02/2020 6.9 (H) 4.0 - 5.6 % Final     Comment:     ADA Screening Guidelines:  5.7-6.4%  Consistent with prediabetes  >or=6.5%  Consistent with diabetes  High levels of fetal hemoglobin interfere with the HbA1C  assay. Heterozygous hemoglobin variants (HbS, HgC, etc)do  not significantly interfere with this assay.   However, presence of multiple variants may affect accuracy.     07/08/2020 9.0 (H) 4.0 - 5.6 % Final     Comment:     ADA Screening Guidelines:  5.7-6.4%  Consistent with prediabetes  >or=6.5%  Consistent with diabetes  High levels of fetal hemoglobin interfere with the HbA1C  assay. Heterozygous hemoglobin variants (HbS, HgC, etc)do  not " significantly interfere with this assay.   However, presence of multiple variants may affect accuracy.     05/29/2020 8.6 (H) 4.0 - 5.6 % Final     Comment:     ADA Screening Guidelines:  5.7-6.4%  Consistent with prediabetes  >or=6.5%  Consistent with diabetes  High levels of fetal hemoglobin interfere with the HbA1C  assay. Heterozygous hemoglobin variants (HbS, HgC, etc)do  not significantly interfere with this assay.   However, presence of multiple variants may affect accuracy.             Chemistry        Component Value Date/Time     07/08/2020 1348    K 4.1 07/08/2020 1348    CL 97 07/08/2020 1348    CO2 28 07/08/2020 1348    BUN 18 07/08/2020 1348    CREATININE 1.2 07/08/2020 1348     (H) 07/08/2020 1348        Component Value Date/Time    CALCIUM 9.7 07/08/2020 1348    ALKPHOS 77 07/08/2020 1348    AST 22 07/08/2020 1348    ALT 31 07/08/2020 1348    BILITOT 0.8 07/08/2020 1348    ESTGFRAFRICA >60.0 07/08/2020 1348    EGFRNONAA >60.0 07/08/2020 1348          Lab Results   Component Value Date    LDLCALC 96.4 05/29/2020        Ref. Range 5/29/2020 10:20   Cholesterol Latest Ref Range: 120 - 199 mg/dL 159   HDL Latest Ref Range: 40 - 75 mg/dL 39 (L)   Hdl/Cholesterol Ratio Latest Ref Range: 20.0 - 50.0 % 24.5   LDL Cholesterol External Latest Ref Range: 63.0 - 159.0 mg/dL 96.4   Non-HDL Cholesterol Latest Units: mg/dL 120   Total Cholesterol/HDL Ratio Latest Ref Range: 2.0 - 5.0  4.1   Triglycerides Latest Ref Range: 30 - 150 mg/dL 118     Lab Results   Component Value Date    MICALBCREAT 555.0 (H) 05/29/2020         STANDARDS of CARE:        ASA:               Last eye exam: 7/2020      ASSESSMENT and PLAN:    A1C GOAL: < 7 %       1. Type 2 diabetes, uncontrolled, with neuropathy  BGs are mostly at goal. Needs to improve bedtime BGs. If you continue to eat higher carb dinners, consider increasing Novolog from 17 to 20 or 22 units before dinner. And try to avoid high carb bedtime snacks.   Test  glucose 4x/day.   Return to clinic in 3 months with labs prior.     Hemoglobin A1C   2. Mild nonproliferative diabetic retinopathy of both eyes without macular edema associated with type 2 diabetes mellitus  Improve glycemic control.      3. Essential hypertension  Controlled    4.  obesity Weight loss noted. Encouraged exercise.        Orders Placed This Encounter   Procedures    Hemoglobin A1C     Standing Status:   Future     Standing Expiration Date:   2/7/2022        Follow up in about 3 months (around 3/9/2021).

## 2020-12-09 ENCOUNTER — OFFICE VISIT (OUTPATIENT)
Dept: ENDOCRINOLOGY | Facility: CLINIC | Age: 60
End: 2020-12-09
Payer: COMMERCIAL

## 2020-12-09 VITALS
SYSTOLIC BLOOD PRESSURE: 117 MMHG | WEIGHT: 245.13 LBS | BODY MASS INDEX: 34.18 KG/M2 | DIASTOLIC BLOOD PRESSURE: 69 MMHG | HEART RATE: 85 BPM | TEMPERATURE: 98 F

## 2020-12-09 DIAGNOSIS — I10 ESSENTIAL HYPERTENSION: ICD-10-CM

## 2020-12-09 DIAGNOSIS — E11.3293 MILD NONPROLIFERATIVE DIABETIC RETINOPATHY OF BOTH EYES WITHOUT MACULAR EDEMA ASSOCIATED WITH TYPE 2 DIABETES MELLITUS: ICD-10-CM

## 2020-12-09 DIAGNOSIS — E66.09 NON MORBID OBESITY DUE TO EXCESS CALORIES: ICD-10-CM

## 2020-12-09 PROCEDURE — 3078F PR MOST RECENT DIASTOLIC BLOOD PRESSURE < 80 MM HG: ICD-10-PCS | Mod: CPTII,S$GLB,, | Performed by: NURSE PRACTITIONER

## 2020-12-09 PROCEDURE — 99999 PR PBB SHADOW E&M-EST. PATIENT-LVL IV: CPT | Mod: PBBFAC,,, | Performed by: NURSE PRACTITIONER

## 2020-12-09 PROCEDURE — 99999 PR PBB SHADOW E&M-EST. PATIENT-LVL IV: ICD-10-PCS | Mod: PBBFAC,,, | Performed by: NURSE PRACTITIONER

## 2020-12-09 PROCEDURE — 99214 OFFICE O/P EST MOD 30 MIN: CPT | Mod: S$GLB,,, | Performed by: NURSE PRACTITIONER

## 2020-12-09 PROCEDURE — 3074F PR MOST RECENT SYSTOLIC BLOOD PRESSURE < 130 MM HG: ICD-10-PCS | Mod: CPTII,S$GLB,, | Performed by: NURSE PRACTITIONER

## 2020-12-09 PROCEDURE — 3044F HG A1C LEVEL LT 7.0%: CPT | Mod: CPTII,S$GLB,, | Performed by: NURSE PRACTITIONER

## 2020-12-09 PROCEDURE — 3008F BODY MASS INDEX DOCD: CPT | Mod: CPTII,S$GLB,, | Performed by: NURSE PRACTITIONER

## 2020-12-09 PROCEDURE — 1126F AMNT PAIN NOTED NONE PRSNT: CPT | Mod: S$GLB,,, | Performed by: NURSE PRACTITIONER

## 2020-12-09 PROCEDURE — 3008F PR BODY MASS INDEX (BMI) DOCUMENTED: ICD-10-PCS | Mod: CPTII,S$GLB,, | Performed by: NURSE PRACTITIONER

## 2020-12-09 PROCEDURE — 3078F DIAST BP <80 MM HG: CPT | Mod: CPTII,S$GLB,, | Performed by: NURSE PRACTITIONER

## 2020-12-09 PROCEDURE — 99214 PR OFFICE/OUTPT VISIT, EST, LEVL IV, 30-39 MIN: ICD-10-PCS | Mod: S$GLB,,, | Performed by: NURSE PRACTITIONER

## 2020-12-09 PROCEDURE — 1126F PR PAIN SEVERITY QUANTIFIED, NO PAIN PRESENT: ICD-10-PCS | Mod: S$GLB,,, | Performed by: NURSE PRACTITIONER

## 2020-12-09 PROCEDURE — 3074F SYST BP LT 130 MM HG: CPT | Mod: CPTII,S$GLB,, | Performed by: NURSE PRACTITIONER

## 2020-12-09 PROCEDURE — 3044F PR MOST RECENT HEMOGLOBIN A1C LEVEL <7.0%: ICD-10-PCS | Mod: CPTII,S$GLB,, | Performed by: NURSE PRACTITIONER

## 2020-12-09 RX ORDER — INSULIN GLARGINE 100 [IU]/ML
INJECTION, SOLUTION SUBCUTANEOUS
Qty: 3 BOX | Refills: 2 | Status: SHIPPED | OUTPATIENT
Start: 2020-12-09 | End: 2021-04-16

## 2020-12-09 RX ORDER — PEN NEEDLE, DIABETIC 30 GX3/16"
NEEDLE, DISPOSABLE MISCELLANEOUS
Qty: 400 EACH | Refills: 3 | Status: SHIPPED | OUTPATIENT
Start: 2020-12-09 | End: 2021-06-14 | Stop reason: SDUPTHER

## 2020-12-09 RX ORDER — EMPAGLIFLOZIN 10 MG/1
10 TABLET, FILM COATED ORAL DAILY
Qty: 90 TABLET | Refills: 2 | Status: SHIPPED | OUTPATIENT
Start: 2020-12-09 | End: 2021-03-09

## 2020-12-09 RX ORDER — INSULIN ASPART 100 [IU]/ML
INJECTION, SOLUTION INTRAVENOUS; SUBCUTANEOUS
Qty: 4 BOX | Refills: 2 | Status: SHIPPED | OUTPATIENT
Start: 2020-12-09 | End: 2021-02-05 | Stop reason: CLARIF

## 2020-12-14 ENCOUNTER — OFFICE VISIT (OUTPATIENT)
Dept: OTOLARYNGOLOGY | Facility: CLINIC | Age: 60
End: 2020-12-14
Payer: COMMERCIAL

## 2020-12-14 VITALS
DIASTOLIC BLOOD PRESSURE: 66 MMHG | SYSTOLIC BLOOD PRESSURE: 120 MMHG | BODY MASS INDEX: 34.96 KG/M2 | WEIGHT: 249.69 LBS | HEIGHT: 71 IN

## 2020-12-14 DIAGNOSIS — J30.1 SEASONAL ALLERGIC RHINITIS DUE TO POLLEN: ICD-10-CM

## 2020-12-14 DIAGNOSIS — L72.3 SEBACEOUS CYST OF EAR: Primary | ICD-10-CM

## 2020-12-14 DIAGNOSIS — R06.83 SNORING: ICD-10-CM

## 2020-12-14 PROCEDURE — 1126F PR PAIN SEVERITY QUANTIFIED, NO PAIN PRESENT: ICD-10-PCS | Mod: S$GLB,,, | Performed by: OTOLARYNGOLOGY

## 2020-12-14 PROCEDURE — 99204 PR OFFICE/OUTPT VISIT, NEW, LEVL IV, 45-59 MIN: ICD-10-PCS | Mod: S$GLB,,, | Performed by: OTOLARYNGOLOGY

## 2020-12-14 PROCEDURE — 3074F PR MOST RECENT SYSTOLIC BLOOD PRESSURE < 130 MM HG: ICD-10-PCS | Mod: CPTII,S$GLB,, | Performed by: OTOLARYNGOLOGY

## 2020-12-14 PROCEDURE — 3078F DIAST BP <80 MM HG: CPT | Mod: CPTII,S$GLB,, | Performed by: OTOLARYNGOLOGY

## 2020-12-14 PROCEDURE — 99204 OFFICE O/P NEW MOD 45 MIN: CPT | Mod: S$GLB,,, | Performed by: OTOLARYNGOLOGY

## 2020-12-14 PROCEDURE — 3074F SYST BP LT 130 MM HG: CPT | Mod: CPTII,S$GLB,, | Performed by: OTOLARYNGOLOGY

## 2020-12-14 PROCEDURE — 3008F BODY MASS INDEX DOCD: CPT | Mod: CPTII,S$GLB,, | Performed by: OTOLARYNGOLOGY

## 2020-12-14 PROCEDURE — 1126F AMNT PAIN NOTED NONE PRSNT: CPT | Mod: S$GLB,,, | Performed by: OTOLARYNGOLOGY

## 2020-12-14 PROCEDURE — 3008F PR BODY MASS INDEX (BMI) DOCUMENTED: ICD-10-PCS | Mod: CPTII,S$GLB,, | Performed by: OTOLARYNGOLOGY

## 2020-12-14 PROCEDURE — 3078F PR MOST RECENT DIASTOLIC BLOOD PRESSURE < 80 MM HG: ICD-10-PCS | Mod: CPTII,S$GLB,, | Performed by: OTOLARYNGOLOGY

## 2020-12-14 RX ORDER — FLUTICASONE PROPIONATE 50 MCG
1 SPRAY, SUSPENSION (ML) NASAL 2 TIMES DAILY
Qty: 18.2 ML | Refills: 3 | Status: SHIPPED | OUTPATIENT
Start: 2020-12-14

## 2020-12-14 NOTE — PATIENT INSTRUCTIONS
"Information and instructions from your visit with me today:    · Start using the following medication nasal sprays:   · Fluticasone spray:    This medication is a steroid spray. It stays within the nose and does not have absorption into the body that leads to side effects that one has with oral steroid medication. Fluticasone nasal spray is the same as the Flonase brand nasal spray. Discuss with your pharmacist if the price is lower over the counter or with a prescription ( this varies depending on insurance). The medication that is over the counter is the same as the prescription medication. Use this medication as instructed on the prescription, 1 spray on each side of your nose twice daily.         Additional instructions for medication sprays  Place the tip of the medication bottle in your nose and aim slightly up and out on each side to get medication high and deep into your nose and sinuses, and not have it all deposit in the very front of your nose. Aim the tip of the nozzle towards the outer corner of your eye . You can imagine aiming towards the back of your eyeball on each side for this, as opposed to straight back to the center of your nose and head.     You need to use this medication every day regardless of symptoms, as it takes time ( a few weeks) to work and get the benefits. It does not work on an "as needed" basis like taking a decongestant. If your symptoms only occur in a particular season, then the medication can be used seasonally instead of year long. For seasonal symptoms, you should start using the spray twice daily a month before when you normally have symptoms ( for example, if symptoms start in August, should start at the end of June).     · Start nasal irrigations with saline solution- you can either use a rinse or a mist spray:    SALINE SINUS RINSE ([employer] brand): You should do a full bottle, half on one side of your nose and half on the other, 1-2 times per day (or more if able to, " you cannot do this too much). Follow the instructions on the box: mix the salt packet with clean water (bottle, previously boiled, distilled, etc -- not tap water) to the line on the bottle to make the irrigation.         NASAL SALINE SPRAY ( simply saline and arm and hammer are examples) There are several different brands found in the cold and flu aisle of the pharmacy. You can use any brand of saline spray - this will deliver the saline by a gentle mist ( if you have difficulty or discomfort with nasal rinse/ a lot of fluid in the nose, this will be more comfortable).       Always rinse your nose with saline prior to using medication sprays and wait a couple of hours before using again. You can use the saline throughout the day to help with stuffy nose or dry nose.    · Do not use nasal decongestant sprays such as Afrin or similar products long term ( over 3 days) .  This can cause long term physical nasal addiction. Afrin should only be used if having nose bleeds, severe nasal congestion , or severe ear pain/fullness and should not be used for more than 2-3 days in a row . It is a not a medication that should be used for a long period of time.     It was nice meeting you today, and I look forward to helping you feel better soon. Please don't hesitate to call if you have any other questions or concerns, or if I can be of any assistance in the meantime.      Patricia Booker MD    [facility]     Phone  [phone]    Fax      [phone]        Patricia Booker MD  Otorhinolaryngology

## 2020-12-14 NOTE — LETTER
December 14, 2020      Zaire Rodrigues MD  [address] [facility]  [place] [address]           [facility]  [address]  [place] [address]  Phone: [phone]          Patient: Connor Zuluaga   MR Number: [id number]   YOB: 1960   Date of Visit: 12/14/2020       Dear Dr. Zaire Rodrigues:    Thank you for referring Connor Zuluaga to me for evaluation. Attached you will find relevant portions of my assessment and plan of care.    If you have questions, please do not hesitate to call me. I look forward to following Connor Zuluaga along with you.    Sincerely,    Patricia Booker MD    Enclosure  CC:  No Recipients    If you would like to receive this communication electronically, please contact [email or web address] or [phone] to request more information on [employer] Link access.    For providers and/or their staff who would like to refer a patient to [facility], please contact us through our one-stop-shop provider referral line, [facility] [place], at [phone].    If you feel you have received this communication in error or would no longer like to receive these types of communications, please e-mail [email or web address]

## 2020-12-14 NOTE — PROGRESS NOTES
OTOLARYNGOLOGY CLINIC NOTE  Date:  12/14/2020     Chief complaint:  Chief Complaint   Patient presents with    Other     EAR LESION ON LEFT BACKSIDE OF EAR, ALSO WOULD  LIEK TO SPEAK TO YOU ABOUT NASAL POLYP.       History of Present Illness  Connor Zuluaga is a 60 y.o. male  presenting today for a new evaluation and treatment of ear cyst.  He states that a nodule under the skin of his ear lobe came up approximately 3-4 months ago.  He reports that he saw an outside dermatologist for this and he reports that the dermatologist injected a steroid and that this did not make much of a difference although it did decrease in size of a slight amount. He had drainage from it 2 weeks ago - back of ear got crusty and then he squeezed it and clear drainage with white chunky stuff extruded and the nodule decreased in size significantly.  He can still feel a small knot under the scan however.No pain. No change in hearing.  No facial numbness and no facial weakness.    No history of sebaceuos  cyst on other parts of his body. No personal history of skin cancer. Father had skin cancer unknwon type.     Was diagnosed with nasal polyps at age 12-13 years.  He reports that he never had surgery for this. Was given some unknown medication which he describes as a green tablet, I am not sure what this would have been.  He is wondering if he still has nasal polyps.  He states that at  certain times of the year his nose feels stuffy and he gets slight sneezing. - zyrtec helps. Restricted breathing In nose always but worse during certain times of year - he thinks may be due to pollen.  Happens after cutting grass, usually in the summertime. Gets some postnasal drip , gets some phlegm in the throat     Has been diagnosed with hiatal hernia; gets bad chest pain if goes off of omeprazole. When goes off medication for a day gets really bad chest pain  He reports that he had a swallow test that showed partial hiatal hernia.  He states that  no one ever discussed surgery with him.  He has never seen a gastroenterologist.    He does have a history of snoring.  He is unsure if he ever wakes up choking or gagging.  He states that he is a restless sleeper however he thinks this is just due to generalized body aches.  Because of this inability to sleep while he does feel tired quite often during the day.      Past Medical History  Past Medical History:   Diagnosis Date    Diabetes mellitus, type 2     Gout     Hyperlipidemia     Hypertension     Mild nonproliferative diabetic retinopathy of both eyes without macular edema associated with type 2 diabetes mellitus 11/21/2018        Past Surgical History  Past Surgical History:   Procedure Laterality Date    RETINAL LASER PROCEDURE Left 5 yrs    TONSILLECTOMY          Medications  Current Outpatient Medications on File Prior to Visit   Medication Sig Dispense Refill    allopurinoL (ZYLOPRIM) 100 MG tablet Take 1 tablet (100 mg total) by mouth once daily. 90 tablet 0    amLODIPine (NORVASC) 10 MG tablet Take 1 tablet (10 mg total) by mouth once daily. 90 tablet 2    atenoloL-chlorthalidone (TENORETIC) 50-25 mg Tab Take 1 tablet by mouth once daily. 90 tablet 3    atorvastatin (LIPITOR) 40 MG tablet Take 1 tablet (40 mg total) by mouth once daily 90 tablet 3    blood sugar diagnostic Strp 1 strip by Misc.(Non-Drug; Combo Route) route 3 (three) times daily. 200 strip 3    diabetic supplies, [employer]. Kit Please change sensor every 14 days. FreeStyle Chyna Sensor 30-day supply (2 sensors/month) 2 kit 5    dulaglutide (TRULICITY) 1.5 mg/0.5 mL pen injector Inject 1.5 mg into the skin every 7 days. 12 pen 3    empagliflozin (JARDIANCE) 10 mg tablet Take 1 tablet (10 mg total) by mouth once daily. 90 tablet 2    enalapril (VASOTEC) 20 MG tablet Take 1 tablet (20 mg total) by mouth 2 (two) times daily. 180 tablet 3    enoxaparin (LOVENOX) 40 mg/0.4 mL Syrg INJECT 0.4ML SUBCUTANEOUSLY EVERY TWENTY FOUR  "HOURS FOR 10 DAYS THANK YOU  0    insulin (LANTUS SOLOSTAR U-100 INSULIN) glargine 100 units/mL (3mL) SubQ pen Inject 40 units daily 3 Box 2    insulin aspart U-100 (NOVOLOG FLEXPEN U-100 INSULIN) 100 unit/mL (3 mL) InPn pen Inject 17-20 Units into the skin 3 (three) times daily before meals. 4 Box 2    lancets (TELCARE LANCETS) 30 gauge Misc 1 lancet by Misc.(Non-Drug; Combo Route) route 3 (three) times daily. 200 each 3    metFORMIN (GLUCOPHAGE) 1000 MG tablet TAKE ONE TABLET (1000MG) BY MOUTH TWICE A DAY AFTER MEALS 180 tablet 3    omeprazole (PRILOSEC) 40 MG capsule TAKE ONE CAPSULE BY MOUTH IN THE MORNING BEFORE BREAKFAST DAILY 90 capsule 2    oxyCODONE-acetaminophen (PERCOCET) 7.5-325 mg per tablet TAKE 1 TABLET BY MOUTH EVERY 4 TO 6 HOURS  0    pen needle, diabetic (BD NEO 2ND GEN PEN NEEDLE) 32 gauge x 5/32" Ndle Use 4x/day 400 each 3     No current facility-administered medications on file prior to visit.        Review of Systems  Review of Systems   Constitutional: Negative for fever.   HENT: Negative for ear pain, sore throat and tinnitus.    Eyes: Negative.    Respiratory: Negative for hemoptysis.    Cardiovascular: Negative.    Gastrointestinal: Positive for heartburn.   Skin: Negative for itching.   Neurological: Negative for dizziness.   Endo/Heme/Allergies: Positive for environmental allergies.   Psychiatric/Behavioral: The patient has insomnia.         Social History   reports that he has been smoking. He has been smoking about 1.00 pack per day. He has never used smokeless tobacco. He reports current alcohol use. He reports that he does not use drugs.     Family History  Family History   Problem Relation Age of Onset    Hyperlipidemia Mother     Hypertension Mother     Diabetes Mother     Cataracts Mother     Hyperlipidemia Father     Hypertension Father     Diabetes Father     Cancer Father         prostate, skin cancer, polyps in intestine    No Known Problems Sister     No " "Known Problems Brother     No Known Problems Maternal Aunt     No Known Problems Maternal Uncle     No Known Problems Paternal Aunt     No Known Problems Paternal Uncle     No Known Problems Maternal Grandmother     No Known Problems Maternal Grandfather     No Known Problems Paternal Grandmother     No Known Problems Paternal Grandfather     Amblyopia Neg Hx     Blindness Neg Hx     Glaucoma Neg Hx     Macular degeneration Neg Hx     Retinal detachment Neg Hx     Strabismus Neg Hx     Stroke Neg Hx     Thyroid disease Neg Hx         Physical Exam   Vitals:    12/14/20 1514   BP: 120/66    Body mass index is 34.82 kg/m².  Weight: 113.3 kg (249 lb 10.7 oz)   Height: 5' 11" (180.3 cm)     GENERAL: no acute distress.  HEAD: normocephalic.   SKIN: no malignant appearing lesions of skin of head and neck area.  EYES: lids and lashes normal. Pupils equal  No proptosis  EARS: external ear without superficial lesion left ear lobule with very small hardly palpable nodule underneath the skin adjacent to the anti tragus on the posterior lobe there was a crust present there was some slight erythema of the skin of the anti tragus but no ulceration, normal pinna shape and position.  External auditory canal with normal cerumen, tympanic membrane fully visible, no perforation , no retraction. No middle ear effusion. Ossicles intact.   NOSE: external nose without significant bony abnormality, septum grossly midline with moderate turbinate hypertrophy and no obvious polyps and no purulent drainage on anterior rhinoscopy  ORAL CAVITY/OROPHARYNX: dentition fair, oral mucosa dry.  Modified Mallampati 3-4.  no mass or lesion of gingiva/buccal mucosa/floor of mouth/tongue. tongue midline and mobile.  Symmetric palate rise. Uvula midline.   NECK: trachea midline.   LYMPH NODES:No cervical lymphadenopathy.  RESPIRATORY: no stridor, no stertor. Voice normal. Respirations nonlabored.  NEURO: alert, responds to questions " appropriately.   Cranial nerve exam as indicated in above sections and additionally showed intact facial sensation to light touch bilaterally in V1,V2 and V3. Facial movement symmetric with good eye closure and symmetric smile.   PSYCH:mood appropriate      Imaging:  The patient does not have any pertinent and/or recent imaging of the head and neck.     Labs:  CBC  Recent Labs   Lab 05/29/20  1020   WBC 10.31   Hemoglobin 15.9   Hematocrit 49.7   MCV 96   Platelets 226     BMP  Recent Labs   Lab 05/29/20  1020 07/08/20  1348   Glucose 223 H 276 H   Sodium 136 137   Potassium 4.2 4.1   Chloride 96 97   CO2 25 28   BUN 17 18   Creatinine 1.1 1.2   Calcium 9.6 9.7     COAGS        Assessment  1. Sebaceous cyst of ear  - Ambulatory referral/consult to ENT  - COVID-19 Routine Screening; Future    2. Seasonal allergic rhinitis due to pollen    3. Snoring  - Ambulatory referral/consult to Sleep Disorders; Future  - COVID-19 Routine Screening; Future       Plan:  Discussed plan of care with patient in detail and all questions answered. Patient reported understanding of plan of care.   Sebaceous cyst of left ear lobule:  I had a long discussion with patient about removal of residual cyst sac.  I would recommend waiting for another month or so at minimum to see if the sac fills back up with fluid which would give us a better ability to remove this sac completely.  Because it has spontaneously drained, it will be difficult to definitively trace the entire lining of the cyst sac which would increase risk of recurrence of cyst.  He is significantly bothered by the residual nodule that he can palpate, therefore I offered to attempt to excise this at his follow-up visit if he is so inclined however he must be aware that if we proceed with doing this there is a high chance of this is recurring and need for additional procedure.    Allergic rhinitis:  He has symptoms of seasonal allergic rhinitis.  Will start with Flonase and  saline.  Discussed with patient that may need to start dual nasal spray therapy in the future as combo of steroid and antihistamine nasal spray has been shown in evidence based studies to be better than either alone. Discussed medication administration technique ( point toward outer corner of eye and not towards nasal septum) and use nasal saline prior to medication sprays. Counseled on risk of bleeding, risk of increased intraocular pressure/cataract with long term use    Unclear history regarding subjective history of being told he had nasal polyps.  Typically these do not respond without surgical intervention however in some patients these can decrease in size with oral steroids but typically recur.  I did not see any obvious evidence of polyps on anterior rhinoscopy today however we discussed that he needs a nasal endoscopy to accurately evaluate for nasal polyposis.  I am unable to do this today as he has not had a COVID pre screen test.    The patient needs evaluation with flexible laryngoscope given symptoms discussed at today's visit that were not known prior to the appointment/apptmt scheduled within time frame that covid test could not be obtained/office staff unable to contact pt prior to apptmt. I am unable to perform this portion of the exam today due to [facility] policy of requiring a negative covid19 test 48-72 hours prior to any scope exam. I discussed this with the patient. I will set up the patient  for a follow up appointment and give information about when/where to go for test.Patient instructed not to get test sooner than 48-72 hours prior as I will not be able to use that test. Patient instructed to go to locations given to allow for correct turnover time for processing the test.    Snoring and insomnia:  From subjective review it is a bit difficult to tell if he has primary snoring or obstructive sleep apnea.  He does note significant restless sleep due to body aches and . We had a long  discussion and he describes the achy sensation has not joint or body pain that he experiences otherwise during the day and he does not know positional discomfort per se. possibly could be restless leg syndrome?  I will refer him to Sleep Medicine for further evaluation of this and to get their input about primary snoring versus sleep apnea as well.        Follow up in 6-8 weeks for nasal endoscopy and to recheck his ear

## 2021-01-19 ENCOUNTER — TELEPHONE (OUTPATIENT)
Dept: OTOLARYNGOLOGY | Facility: CLINIC | Age: 61
End: 2021-01-19

## 2021-01-19 DIAGNOSIS — M10.00 IDIOPATHIC GOUT, UNSPECIFIED CHRONICITY, UNSPECIFIED SITE: ICD-10-CM

## 2021-01-21 RX ORDER — ALLOPURINOL 100 MG/1
100 TABLET ORAL DAILY
Qty: 90 TABLET | Refills: 0 | Status: SHIPPED | OUTPATIENT
Start: 2021-01-21 | End: 2021-01-25

## 2021-01-22 DIAGNOSIS — M10.00 IDIOPATHIC GOUT, UNSPECIFIED CHRONICITY, UNSPECIFIED SITE: ICD-10-CM

## 2021-01-25 RX ORDER — ALLOPURINOL 100 MG/1
100 TABLET ORAL DAILY
Qty: 90 TABLET | Refills: 1 | Status: SHIPPED | OUTPATIENT
Start: 2021-01-25 | End: 2021-06-09 | Stop reason: SDUPTHER

## 2021-01-27 ENCOUNTER — OFFICE VISIT (OUTPATIENT)
Dept: PULMONOLOGY | Facility: CLINIC | Age: 61
End: 2021-01-27
Payer: COMMERCIAL

## 2021-01-27 VITALS
OXYGEN SATURATION: 98 % | HEART RATE: 74 BPM | SYSTOLIC BLOOD PRESSURE: 113 MMHG | DIASTOLIC BLOOD PRESSURE: 71 MMHG | HEIGHT: 71 IN | WEIGHT: 243.5 LBS | BODY MASS INDEX: 34.09 KG/M2 | TEMPERATURE: 98 F

## 2021-01-27 DIAGNOSIS — R06.83 SNORING: ICD-10-CM

## 2021-01-27 DIAGNOSIS — F17.200 TOBACCO DEPENDENCE: ICD-10-CM

## 2021-01-27 DIAGNOSIS — G47.00 INSOMNIA, UNSPECIFIED TYPE: ICD-10-CM

## 2021-01-27 DIAGNOSIS — G47.33 OSA (OBSTRUCTIVE SLEEP APNEA): ICD-10-CM

## 2021-01-27 PROCEDURE — 1126F AMNT PAIN NOTED NONE PRSNT: CPT | Mod: S$GLB,,, | Performed by: INTERNAL MEDICINE

## 2021-01-27 PROCEDURE — 99244 OFF/OP CNSLTJ NEW/EST MOD 40: CPT | Mod: S$GLB,,, | Performed by: INTERNAL MEDICINE

## 2021-01-27 PROCEDURE — 99999 PR PBB SHADOW E&M-EST. PATIENT-LVL V: CPT | Mod: PBBFAC,,, | Performed by: INTERNAL MEDICINE

## 2021-01-27 PROCEDURE — 3008F PR BODY MASS INDEX (BMI) DOCUMENTED: ICD-10-PCS | Mod: CPTII,S$GLB,, | Performed by: INTERNAL MEDICINE

## 2021-01-27 PROCEDURE — 99244 PR OFFICE CONSULTATION,LEVEL IV: ICD-10-PCS | Mod: S$GLB,,, | Performed by: INTERNAL MEDICINE

## 2021-01-27 PROCEDURE — 3008F BODY MASS INDEX DOCD: CPT | Mod: CPTII,S$GLB,, | Performed by: INTERNAL MEDICINE

## 2021-01-27 PROCEDURE — 99999 PR PBB SHADOW E&M-EST. PATIENT-LVL V: ICD-10-PCS | Mod: PBBFAC,,, | Performed by: INTERNAL MEDICINE

## 2021-01-27 PROCEDURE — 1126F PR PAIN SEVERITY QUANTIFIED, NO PAIN PRESENT: ICD-10-PCS | Mod: S$GLB,,, | Performed by: INTERNAL MEDICINE

## 2021-02-05 ENCOUNTER — PATIENT MESSAGE (OUTPATIENT)
Dept: ENDOCRINOLOGY | Facility: CLINIC | Age: 61
End: 2021-02-05

## 2021-02-08 ENCOUNTER — LAB VISIT (OUTPATIENT)
Dept: FAMILY MEDICINE | Facility: CLINIC | Age: 61
End: 2021-02-08
Payer: COMMERCIAL

## 2021-02-08 DIAGNOSIS — L72.3 SEBACEOUS CYST OF EAR: ICD-10-CM

## 2021-02-08 DIAGNOSIS — R06.83 SNORING: ICD-10-CM

## 2021-02-08 PROCEDURE — U0003 INFECTIOUS AGENT DETECTION BY NUCLEIC ACID (DNA OR RNA); SEVERE ACUTE RESPIRATORY SYNDROME CORONAVIRUS 2 (SARS-COV-2) (CORONAVIRUS DISEASE [COVID-19]), AMPLIFIED PROBE TECHNIQUE, MAKING USE OF HIGH THROUGHPUT TECHNOLOGIES AS DESCRIBED BY CMS-2020-01-R: HCPCS

## 2021-02-08 PROCEDURE — U0005 INFEC AGEN DETEC AMPLI PROBE: HCPCS

## 2021-02-10 ENCOUNTER — OFFICE VISIT (OUTPATIENT)
Dept: OTOLARYNGOLOGY | Facility: CLINIC | Age: 61
End: 2021-02-10
Payer: COMMERCIAL

## 2021-02-10 VITALS
HEIGHT: 71 IN | WEIGHT: 243.19 LBS | TEMPERATURE: 98 F | BODY MASS INDEX: 34.05 KG/M2 | DIASTOLIC BLOOD PRESSURE: 80 MMHG | SYSTOLIC BLOOD PRESSURE: 116 MMHG

## 2021-02-10 DIAGNOSIS — J32.4 CHRONIC PANSINUSITIS: ICD-10-CM

## 2021-02-10 DIAGNOSIS — J33.9 NASAL POLYPS: Primary | ICD-10-CM

## 2021-02-10 DIAGNOSIS — L72.3 SEBACEOUS CYST OF EAR: Primary | ICD-10-CM

## 2021-02-10 DIAGNOSIS — J30.2 SEASONAL ALLERGIC RHINITIS, UNSPECIFIED TRIGGER: ICD-10-CM

## 2021-02-10 LAB — SARS-COV-2 RNA RESP QL NAA+PROBE: NOT DETECTED

## 2021-02-10 PROCEDURE — 3079F PR MOST RECENT DIASTOLIC BLOOD PRESSURE 80-89 MM HG: ICD-10-PCS | Mod: CPTII,S$GLB,, | Performed by: OTOLARYNGOLOGY

## 2021-02-10 PROCEDURE — 99214 OFFICE O/P EST MOD 30 MIN: CPT | Mod: 25,S$GLB,, | Performed by: OTOLARYNGOLOGY

## 2021-02-10 PROCEDURE — 3008F PR BODY MASS INDEX (BMI) DOCUMENTED: ICD-10-PCS | Mod: CPTII,S$GLB,, | Performed by: OTOLARYNGOLOGY

## 2021-02-10 PROCEDURE — 1126F AMNT PAIN NOTED NONE PRSNT: CPT | Mod: S$GLB,,, | Performed by: OTOLARYNGOLOGY

## 2021-02-10 PROCEDURE — 3074F SYST BP LT 130 MM HG: CPT | Mod: CPTII,S$GLB,, | Performed by: OTOLARYNGOLOGY

## 2021-02-10 PROCEDURE — 3074F PR MOST RECENT SYSTOLIC BLOOD PRESSURE < 130 MM HG: ICD-10-PCS | Mod: CPTII,S$GLB,, | Performed by: OTOLARYNGOLOGY

## 2021-02-10 PROCEDURE — 1126F PR PAIN SEVERITY QUANTIFIED, NO PAIN PRESENT: ICD-10-PCS | Mod: S$GLB,,, | Performed by: OTOLARYNGOLOGY

## 2021-02-10 PROCEDURE — 31231 PR NASAL ENDOSCOPY, DX: ICD-10-PCS | Mod: S$GLB,,, | Performed by: OTOLARYNGOLOGY

## 2021-02-10 PROCEDURE — 3079F DIAST BP 80-89 MM HG: CPT | Mod: CPTII,S$GLB,, | Performed by: OTOLARYNGOLOGY

## 2021-02-10 PROCEDURE — 3008F BODY MASS INDEX DOCD: CPT | Mod: CPTII,S$GLB,, | Performed by: OTOLARYNGOLOGY

## 2021-02-10 PROCEDURE — 99214 PR OFFICE/OUTPT VISIT, EST, LEVL IV, 30-39 MIN: ICD-10-PCS | Mod: 25,S$GLB,, | Performed by: OTOLARYNGOLOGY

## 2021-02-10 PROCEDURE — 31231 NASAL ENDOSCOPY DX: CPT | Mod: S$GLB,,, | Performed by: OTOLARYNGOLOGY

## 2021-02-10 RX ORDER — BUDESONIDE 0.5 MG/2ML
0.5 INHALANT ORAL DAILY
Qty: 180 ML | Refills: 3 | Status: SHIPPED | OUTPATIENT
Start: 2021-02-10 | End: 2022-02-10

## 2021-02-10 RX ORDER — AZELASTINE 1 MG/ML
1 SPRAY, METERED NASAL 2 TIMES DAILY
Qty: 30 ML | Refills: 3 | Status: SHIPPED | OUTPATIENT
Start: 2021-02-10 | End: 2021-06-14 | Stop reason: SDUPTHER

## 2021-02-11 ENCOUNTER — PATIENT MESSAGE (OUTPATIENT)
Dept: PULMONOLOGY | Facility: CLINIC | Age: 61
End: 2021-02-11

## 2021-02-17 ENCOUNTER — CLINICAL SUPPORT (OUTPATIENT)
Dept: FAMILY MEDICINE | Facility: CLINIC | Age: 61
End: 2021-02-17
Payer: COMMERCIAL

## 2021-02-17 VITALS — TEMPERATURE: 98 F

## 2021-02-17 DIAGNOSIS — Z23 NEED FOR SHINGLES VACCINE: Primary | ICD-10-CM

## 2021-02-17 PROCEDURE — 99999 PR PBB SHADOW E&M-EST. PATIENT-LVL I: CPT | Mod: PBBFAC,,,

## 2021-02-17 PROCEDURE — 90750 HZV VACC RECOMBINANT IM: CPT | Mod: S$GLB,,, | Performed by: FAMILY MEDICINE

## 2021-02-17 PROCEDURE — 99999 PR PBB SHADOW E&M-EST. PATIENT-LVL I: ICD-10-PCS | Mod: PBBFAC,,,

## 2021-02-17 PROCEDURE — 90471 IMMUNIZATION ADMIN: CPT | Mod: S$GLB,,, | Performed by: FAMILY MEDICINE

## 2021-02-17 PROCEDURE — 90750 ZOSTER RECOMBINANT VACCINE: ICD-10-PCS | Mod: S$GLB,,, | Performed by: FAMILY MEDICINE

## 2021-02-17 PROCEDURE — 90471 ZOSTER RECOMBINANT VACCINE: ICD-10-PCS | Mod: S$GLB,,, | Performed by: FAMILY MEDICINE

## 2021-02-22 ENCOUNTER — HOSPITAL ENCOUNTER (OUTPATIENT)
Dept: SLEEP MEDICINE | Facility: HOSPITAL | Age: 61
Discharge: HOME | End: 2021-02-22
Attending: INTERNAL MEDICINE
Payer: COMMERCIAL

## 2021-02-22 DIAGNOSIS — G47.33 OSA (OBSTRUCTIVE SLEEP APNEA): ICD-10-CM

## 2021-02-22 PROCEDURE — 95800 PR SLEEP STUDY, UNATTENDED, RECORD HEART RATE/O2 SAT/RESP ANAL/SLEEP TIME: ICD-10-PCS | Mod: 26,,, | Performed by: INTERNAL MEDICINE

## 2021-02-22 PROCEDURE — 95800 SLP STDY UNATTENDED: CPT | Mod: 26,,, | Performed by: INTERNAL MEDICINE

## 2021-02-22 PROCEDURE — 95800 SLP STDY UNATTENDED: CPT

## 2021-02-24 ENCOUNTER — TELEPHONE (OUTPATIENT)
Dept: PULMONOLOGY | Facility: CLINIC | Age: 61
End: 2021-02-24

## 2021-03-02 ENCOUNTER — LAB VISIT (OUTPATIENT)
Dept: LAB | Facility: HOSPITAL | Age: 61
End: 2021-03-02
Attending: NURSE PRACTITIONER
Payer: COMMERCIAL

## 2021-03-02 LAB
ESTIMATED AVG GLUCOSE: 137 MG/DL (ref 68–131)
HBA1C MFR BLD: 6.4 % (ref 4–5.6)

## 2021-03-02 PROCEDURE — 36415 COLL VENOUS BLD VENIPUNCTURE: CPT | Mod: PO

## 2021-03-02 PROCEDURE — 83036 HEMOGLOBIN GLYCOSYLATED A1C: CPT

## 2021-03-07 ENCOUNTER — PATIENT MESSAGE (OUTPATIENT)
Dept: ENDOCRINOLOGY | Facility: CLINIC | Age: 61
End: 2021-03-07

## 2021-03-08 ENCOUNTER — OFFICE VISIT (OUTPATIENT)
Dept: PULMONOLOGY | Facility: CLINIC | Age: 61
End: 2021-03-08
Payer: COMMERCIAL

## 2021-03-08 ENCOUNTER — PATIENT MESSAGE (OUTPATIENT)
Dept: ENDOCRINOLOGY | Facility: CLINIC | Age: 61
End: 2021-03-08

## 2021-03-08 DIAGNOSIS — G47.33 OSA (OBSTRUCTIVE SLEEP APNEA): Primary | ICD-10-CM

## 2021-03-08 PROCEDURE — 99214 OFFICE O/P EST MOD 30 MIN: CPT | Mod: 95,,, | Performed by: INTERNAL MEDICINE

## 2021-03-08 PROCEDURE — 99214 PR OFFICE/OUTPT VISIT, EST, LEVL IV, 30-39 MIN: ICD-10-PCS | Mod: 95,,, | Performed by: INTERNAL MEDICINE

## 2021-03-09 ENCOUNTER — OFFICE VISIT (OUTPATIENT)
Dept: ENDOCRINOLOGY | Facility: CLINIC | Age: 61
End: 2021-03-09
Payer: COMMERCIAL

## 2021-03-09 VITALS
HEART RATE: 83 BPM | WEIGHT: 244.88 LBS | DIASTOLIC BLOOD PRESSURE: 73 MMHG | SYSTOLIC BLOOD PRESSURE: 127 MMHG | BODY MASS INDEX: 34.16 KG/M2 | TEMPERATURE: 98 F

## 2021-03-09 DIAGNOSIS — E11.40 TYPE 2 DIABETES, CONTROLLED, WITH NEUROPATHY: Primary | ICD-10-CM

## 2021-03-09 DIAGNOSIS — E66.09 NON MORBID OBESITY DUE TO EXCESS CALORIES: ICD-10-CM

## 2021-03-09 DIAGNOSIS — I10 ESSENTIAL HYPERTENSION: ICD-10-CM

## 2021-03-09 DIAGNOSIS — E78.5 HYPERLIPIDEMIA, UNSPECIFIED HYPERLIPIDEMIA TYPE: ICD-10-CM

## 2021-03-09 PROCEDURE — 3074F SYST BP LT 130 MM HG: CPT | Mod: CPTII,S$GLB,, | Performed by: NURSE PRACTITIONER

## 2021-03-09 PROCEDURE — 3008F PR BODY MASS INDEX (BMI) DOCUMENTED: ICD-10-PCS | Mod: CPTII,S$GLB,, | Performed by: NURSE PRACTITIONER

## 2021-03-09 PROCEDURE — 3008F BODY MASS INDEX DOCD: CPT | Mod: CPTII,S$GLB,, | Performed by: NURSE PRACTITIONER

## 2021-03-09 PROCEDURE — 99214 PR OFFICE/OUTPT VISIT, EST, LEVL IV, 30-39 MIN: ICD-10-PCS | Mod: S$GLB,,, | Performed by: NURSE PRACTITIONER

## 2021-03-09 PROCEDURE — 99999 PR PBB SHADOW E&M-EST. PATIENT-LVL V: ICD-10-PCS | Mod: PBBFAC,,, | Performed by: NURSE PRACTITIONER

## 2021-03-09 PROCEDURE — 99214 OFFICE O/P EST MOD 30 MIN: CPT | Mod: S$GLB,,, | Performed by: NURSE PRACTITIONER

## 2021-03-09 PROCEDURE — 99999 PR PBB SHADOW E&M-EST. PATIENT-LVL V: CPT | Mod: PBBFAC,,, | Performed by: NURSE PRACTITIONER

## 2021-03-09 PROCEDURE — 3078F DIAST BP <80 MM HG: CPT | Mod: CPTII,S$GLB,, | Performed by: NURSE PRACTITIONER

## 2021-03-09 PROCEDURE — 3044F HG A1C LEVEL LT 7.0%: CPT | Mod: CPTII,S$GLB,, | Performed by: NURSE PRACTITIONER

## 2021-03-09 PROCEDURE — 1126F AMNT PAIN NOTED NONE PRSNT: CPT | Mod: S$GLB,,, | Performed by: NURSE PRACTITIONER

## 2021-03-09 PROCEDURE — 3044F PR MOST RECENT HEMOGLOBIN A1C LEVEL <7.0%: ICD-10-PCS | Mod: CPTII,S$GLB,, | Performed by: NURSE PRACTITIONER

## 2021-03-09 PROCEDURE — 1126F PR PAIN SEVERITY QUANTIFIED, NO PAIN PRESENT: ICD-10-PCS | Mod: S$GLB,,, | Performed by: NURSE PRACTITIONER

## 2021-03-09 PROCEDURE — 3074F PR MOST RECENT SYSTOLIC BLOOD PRESSURE < 130 MM HG: ICD-10-PCS | Mod: CPTII,S$GLB,, | Performed by: NURSE PRACTITIONER

## 2021-03-09 PROCEDURE — 3078F PR MOST RECENT DIASTOLIC BLOOD PRESSURE < 80 MM HG: ICD-10-PCS | Mod: CPTII,S$GLB,, | Performed by: NURSE PRACTITIONER

## 2021-03-09 RX ORDER — INSULIN ASPART 100 [IU]/ML
INJECTION, SOLUTION INTRAVENOUS; SUBCUTANEOUS
Qty: 30 ML | Refills: 4 | Status: SHIPPED | OUTPATIENT
Start: 2021-03-09 | End: 2021-06-14

## 2021-03-09 RX ORDER — DULAGLUTIDE 1.5 MG/.5ML
1.5 INJECTION, SOLUTION SUBCUTANEOUS
Qty: 12 PEN | Refills: 3 | Status: SHIPPED | OUTPATIENT
Start: 2021-03-09 | End: 2021-06-13

## 2021-03-16 ENCOUNTER — PATIENT MESSAGE (OUTPATIENT)
Dept: FAMILY MEDICINE | Facility: CLINIC | Age: 61
End: 2021-03-16

## 2021-03-16 ENCOUNTER — PATIENT MESSAGE (OUTPATIENT)
Dept: ENDOCRINOLOGY | Facility: CLINIC | Age: 61
End: 2021-03-16

## 2021-03-31 ENCOUNTER — PATIENT MESSAGE (OUTPATIENT)
Dept: FAMILY MEDICINE | Facility: CLINIC | Age: 61
End: 2021-03-31

## 2021-03-31 ENCOUNTER — PATIENT MESSAGE (OUTPATIENT)
Dept: PULMONOLOGY | Facility: CLINIC | Age: 61
End: 2021-03-31

## 2021-04-01 ENCOUNTER — TELEPHONE (OUTPATIENT)
Dept: PULMONOLOGY | Facility: CLINIC | Age: 61
End: 2021-04-01

## 2021-04-19 ENCOUNTER — PATIENT MESSAGE (OUTPATIENT)
Dept: ENDOCRINOLOGY | Facility: CLINIC | Age: 61
End: 2021-04-19

## 2021-04-20 ENCOUNTER — PATIENT MESSAGE (OUTPATIENT)
Dept: ENDOCRINOLOGY | Facility: CLINIC | Age: 61
End: 2021-04-20

## 2021-04-20 RX ORDER — LANCETS
EACH MISCELLANEOUS
Qty: 400 EACH | Refills: 3 | Status: SHIPPED | OUTPATIENT
Start: 2021-04-20

## 2021-05-11 DIAGNOSIS — K21.9 GASTROESOPHAGEAL REFLUX DISEASE WITHOUT ESOPHAGITIS: ICD-10-CM

## 2021-05-11 DIAGNOSIS — E78.5 HYPERLIPIDEMIA, UNSPECIFIED HYPERLIPIDEMIA TYPE: ICD-10-CM

## 2021-05-11 DIAGNOSIS — Z79.899 ENCOUNTER FOR LONG-TERM (CURRENT) USE OF OTHER MEDICATIONS: Primary | ICD-10-CM

## 2021-05-11 DIAGNOSIS — I10 ESSENTIAL HYPERTENSION: ICD-10-CM

## 2021-05-14 RX ORDER — ATENOLOL AND CHLORTHALIDONE TABLET 50; 25 MG/1; MG/1
1 TABLET ORAL DAILY
Qty: 90 TABLET | Refills: 0 | Status: SHIPPED | OUTPATIENT
Start: 2021-05-14 | End: 2021-06-09 | Stop reason: SDUPTHER

## 2021-05-14 RX ORDER — OMEPRAZOLE 40 MG/1
CAPSULE, DELAYED RELEASE ORAL
Qty: 90 CAPSULE | Refills: 1 | Status: SHIPPED | OUTPATIENT
Start: 2021-05-14 | End: 2021-06-09 | Stop reason: SDUPTHER

## 2021-05-14 RX ORDER — ENALAPRIL MALEATE 20 MG/1
20 TABLET ORAL 2 TIMES DAILY
Qty: 180 TABLET | Refills: 0 | Status: SHIPPED | OUTPATIENT
Start: 2021-05-14 | End: 2021-06-13

## 2021-05-14 RX ORDER — AMLODIPINE BESYLATE 10 MG/1
10 TABLET ORAL DAILY
Qty: 90 TABLET | Refills: 1 | Status: SHIPPED | OUTPATIENT
Start: 2021-05-14 | End: 2021-06-09 | Stop reason: SDUPTHER

## 2021-05-14 RX ORDER — ATORVASTATIN CALCIUM 40 MG/1
TABLET, FILM COATED ORAL
Qty: 90 TABLET | Refills: 0 | Status: SHIPPED | OUTPATIENT
Start: 2021-05-14 | End: 2021-06-09 | Stop reason: SDUPTHER

## 2021-06-09 ENCOUNTER — PATIENT MESSAGE (OUTPATIENT)
Dept: ENDOCRINOLOGY | Facility: CLINIC | Age: 61
End: 2021-06-09

## 2021-06-09 ENCOUNTER — PATIENT MESSAGE (OUTPATIENT)
Dept: FAMILY MEDICINE | Facility: CLINIC | Age: 61
End: 2021-06-09

## 2021-06-09 ENCOUNTER — OFFICE VISIT (OUTPATIENT)
Dept: PULMONOLOGY | Facility: CLINIC | Age: 61
End: 2021-06-09
Payer: COMMERCIAL

## 2021-06-09 VITALS
WEIGHT: 245.81 LBS | HEART RATE: 74 BPM | OXYGEN SATURATION: 95 % | SYSTOLIC BLOOD PRESSURE: 120 MMHG | HEIGHT: 71 IN | DIASTOLIC BLOOD PRESSURE: 71 MMHG | BODY MASS INDEX: 34.41 KG/M2

## 2021-06-09 DIAGNOSIS — E78.5 HYPERLIPIDEMIA, UNSPECIFIED HYPERLIPIDEMIA TYPE: ICD-10-CM

## 2021-06-09 DIAGNOSIS — G47.33 OSA (OBSTRUCTIVE SLEEP APNEA): ICD-10-CM

## 2021-06-09 DIAGNOSIS — M10.00 IDIOPATHIC GOUT, UNSPECIFIED CHRONICITY, UNSPECIFIED SITE: ICD-10-CM

## 2021-06-09 DIAGNOSIS — E11.40 TYPE 2 DIABETES, CONTROLLED, WITH NEUROPATHY: ICD-10-CM

## 2021-06-09 DIAGNOSIS — K21.9 GASTROESOPHAGEAL REFLUX DISEASE WITHOUT ESOPHAGITIS: ICD-10-CM

## 2021-06-09 DIAGNOSIS — I10 ESSENTIAL HYPERTENSION: ICD-10-CM

## 2021-06-09 PROCEDURE — 1126F AMNT PAIN NOTED NONE PRSNT: CPT | Mod: S$GLB,,, | Performed by: INTERNAL MEDICINE

## 2021-06-09 PROCEDURE — 3008F BODY MASS INDEX DOCD: CPT | Mod: CPTII,S$GLB,, | Performed by: INTERNAL MEDICINE

## 2021-06-09 PROCEDURE — 99999 PR PBB SHADOW E&M-EST. PATIENT-LVL IV: CPT | Mod: PBBFAC,,, | Performed by: INTERNAL MEDICINE

## 2021-06-09 PROCEDURE — 99999 PR PBB SHADOW E&M-EST. PATIENT-LVL IV: ICD-10-PCS | Mod: PBBFAC,,, | Performed by: INTERNAL MEDICINE

## 2021-06-09 PROCEDURE — 1126F PR PAIN SEVERITY QUANTIFIED, NO PAIN PRESENT: ICD-10-PCS | Mod: S$GLB,,, | Performed by: INTERNAL MEDICINE

## 2021-06-09 PROCEDURE — 3008F PR BODY MASS INDEX (BMI) DOCUMENTED: ICD-10-PCS | Mod: CPTII,S$GLB,, | Performed by: INTERNAL MEDICINE

## 2021-06-09 PROCEDURE — 99214 OFFICE O/P EST MOD 30 MIN: CPT | Mod: S$GLB,,, | Performed by: INTERNAL MEDICINE

## 2021-06-09 PROCEDURE — 99214 PR OFFICE/OUTPT VISIT, EST, LEVL IV, 30-39 MIN: ICD-10-PCS | Mod: S$GLB,,, | Performed by: INTERNAL MEDICINE

## 2021-06-09 RX ORDER — PEN NEEDLE, DIABETIC 32 GX 1/4"
NEEDLE, DISPOSABLE MISCELLANEOUS
COMMUNITY
Start: 2021-06-07 | End: 2021-07-27

## 2021-06-09 RX ORDER — AMLODIPINE BESYLATE 10 MG/1
10 TABLET ORAL DAILY
Qty: 90 TABLET | Refills: 1 | Status: SHIPPED | OUTPATIENT
Start: 2021-06-09 | End: 2021-06-13

## 2021-06-09 RX ORDER — ENOXAPARIN SODIUM 100 MG/ML
INJECTION SUBCUTANEOUS
Refills: 0 | OUTPATIENT
Start: 2021-06-09

## 2021-06-09 RX ORDER — LANCETS 33 GAUGE
EACH MISCELLANEOUS 4 TIMES DAILY
COMMUNITY
Start: 2021-06-07 | End: 2021-06-13

## 2021-06-09 RX ORDER — ALLOPURINOL 100 MG/1
100 TABLET ORAL DAILY
Qty: 90 TABLET | Refills: 1 | Status: SHIPPED | OUTPATIENT
Start: 2021-06-09 | End: 2021-06-13

## 2021-06-09 RX ORDER — ATORVASTATIN CALCIUM 40 MG/1
40 TABLET, FILM COATED ORAL DAILY
Qty: 90 TABLET | Refills: 0 | Status: SHIPPED | OUTPATIENT
Start: 2021-06-09 | End: 2021-06-13

## 2021-06-09 RX ORDER — OMEPRAZOLE 40 MG/1
40 CAPSULE, DELAYED RELEASE ORAL EVERY MORNING
Qty: 90 CAPSULE | Refills: 1 | Status: SHIPPED | OUTPATIENT
Start: 2021-06-09 | End: 2021-06-13

## 2021-06-09 RX ORDER — ATENOLOL AND CHLORTHALIDONE TABLET 50; 25 MG/1; MG/1
1 TABLET ORAL DAILY
Qty: 90 TABLET | Refills: 0 | Status: SHIPPED | OUTPATIENT
Start: 2021-06-09 | End: 2021-06-13

## 2021-06-11 ENCOUNTER — TELEPHONE (OUTPATIENT)
Dept: ENDOCRINOLOGY | Facility: CLINIC | Age: 61
End: 2021-06-11

## 2021-06-11 RX ORDER — INSULIN GLARGINE 100 [IU]/ML
INJECTION, SOLUTION SUBCUTANEOUS
Qty: 15 ML | Refills: 2 | Status: CANCELLED | OUTPATIENT
Start: 2021-06-11

## 2021-06-13 RX ORDER — DULAGLUTIDE 1.5 MG/.5ML
1.5 INJECTION, SOLUTION SUBCUTANEOUS
Qty: 12 PEN | Refills: 1 | Status: SHIPPED | OUTPATIENT
Start: 2021-06-13 | End: 2021-11-09

## 2021-06-13 RX ORDER — ALLOPURINOL 100 MG/1
100 TABLET ORAL DAILY
Qty: 90 TABLET | Refills: 1 | Status: SHIPPED | OUTPATIENT
Start: 2021-06-13

## 2021-06-13 RX ORDER — ENALAPRIL MALEATE 20 MG/1
20 TABLET ORAL 2 TIMES DAILY
Qty: 180 TABLET | Refills: 0 | Status: SHIPPED | OUTPATIENT
Start: 2021-06-13 | End: 2021-08-23

## 2021-06-13 RX ORDER — ATORVASTATIN CALCIUM 40 MG/1
40 TABLET, FILM COATED ORAL DAILY
Qty: 90 TABLET | Refills: 0 | Status: SHIPPED | OUTPATIENT
Start: 2021-06-13 | End: 2021-08-23

## 2021-06-13 RX ORDER — OMEPRAZOLE 40 MG/1
40 CAPSULE, DELAYED RELEASE ORAL EVERY MORNING
Qty: 90 CAPSULE | Refills: 1 | Status: SHIPPED | OUTPATIENT
Start: 2021-06-13 | End: 2021-11-19

## 2021-06-13 RX ORDER — AMLODIPINE BESYLATE 10 MG/1
10 TABLET ORAL DAILY
Qty: 90 TABLET | Refills: 1 | Status: SHIPPED | OUTPATIENT
Start: 2021-06-13

## 2021-06-13 RX ORDER — ATENOLOL AND CHLORTHALIDONE TABLET 50; 25 MG/1; MG/1
1 TABLET ORAL DAILY
Qty: 90 TABLET | Refills: 1 | Status: SHIPPED | OUTPATIENT
Start: 2021-06-13

## 2021-06-14 RX ORDER — INSULIN GLARGINE 100 [IU]/ML
INJECTION, SOLUTION SUBCUTANEOUS
Qty: 45 ML | Refills: 0 | Status: SHIPPED | OUTPATIENT
Start: 2021-06-14 | End: 2021-08-09 | Stop reason: SDUPTHER

## 2021-06-14 RX ORDER — LANCETS 33 GAUGE
EACH MISCELLANEOUS
Qty: 400 EACH | Refills: 3 | Status: SHIPPED | OUTPATIENT
Start: 2021-06-14 | End: 2021-06-14 | Stop reason: SDUPTHER

## 2021-06-14 RX ORDER — AZELASTINE 1 MG/ML
1 SPRAY, METERED NASAL 2 TIMES DAILY
Qty: 90 ML | Refills: 3 | Status: SHIPPED | OUTPATIENT
Start: 2021-06-14 | End: 2021-06-16

## 2021-06-14 RX ORDER — INSULIN ASPART 100 [IU]/ML
INJECTION, SOLUTION INTRAVENOUS; SUBCUTANEOUS
Qty: 4 BOX | Refills: 0 | Status: SHIPPED | OUTPATIENT
Start: 2021-06-14 | End: 2021-08-09

## 2021-06-14 RX ORDER — EMPAGLIFLOZIN AND METFORMIN HYDROCHLORIDE 12.5; 1 MG/1; MG/1
TABLET ORAL
Qty: 180 TABLET | Refills: 0 | Status: SHIPPED | OUTPATIENT
Start: 2021-06-14 | End: 2021-08-25

## 2021-06-16 DIAGNOSIS — E11.9 TYPE 2 DIABETES MELLITUS WITHOUT COMPLICATION: ICD-10-CM

## 2021-07-06 ENCOUNTER — PATIENT MESSAGE (OUTPATIENT)
Dept: ADMINISTRATIVE | Facility: HOSPITAL | Age: 61
End: 2021-07-06

## 2021-07-08 ENCOUNTER — PATIENT MESSAGE (OUTPATIENT)
Dept: FAMILY MEDICINE | Facility: CLINIC | Age: 61
End: 2021-07-08

## 2021-07-08 DIAGNOSIS — Z00.00 ROUTINE PHYSICAL EXAMINATION: Primary | ICD-10-CM

## 2021-07-15 ENCOUNTER — LAB VISIT (OUTPATIENT)
Dept: LAB | Facility: HOSPITAL | Age: 61
End: 2021-07-15
Attending: FAMILY MEDICINE
Payer: COMMERCIAL

## 2021-07-15 DIAGNOSIS — E11.9 TYPE 2 DIABETES MELLITUS WITHOUT COMPLICATION: ICD-10-CM

## 2021-07-15 DIAGNOSIS — E78.5 HYPERLIPIDEMIA, UNSPECIFIED HYPERLIPIDEMIA TYPE: ICD-10-CM

## 2021-07-15 DIAGNOSIS — Z79.899 ENCOUNTER FOR LONG-TERM (CURRENT) USE OF OTHER MEDICATIONS: ICD-10-CM

## 2021-07-15 DIAGNOSIS — I10 ESSENTIAL HYPERTENSION: ICD-10-CM

## 2021-07-15 DIAGNOSIS — Z00.00 ROUTINE PHYSICAL EXAMINATION: ICD-10-CM

## 2021-07-15 LAB
ALBUMIN SERPL BCP-MCNC: 4.3 G/DL (ref 3.5–5.2)
ALP SERPL-CCNC: 62 U/L (ref 55–135)
ALT SERPL W/O P-5'-P-CCNC: 23 U/L (ref 10–44)
ANION GAP SERPL CALC-SCNC: 11 MMOL/L (ref 8–16)
AST SERPL-CCNC: 21 U/L (ref 10–40)
BILIRUB SERPL-MCNC: 1.2 MG/DL (ref 0.1–1)
BUN SERPL-MCNC: 24 MG/DL (ref 6–20)
CALCIUM SERPL-MCNC: 10 MG/DL (ref 8.7–10.5)
CHLORIDE SERPL-SCNC: 99 MMOL/L (ref 95–110)
CHOLEST SERPL-MCNC: 144 MG/DL (ref 120–199)
CHOLEST/HDLC SERPL: 4 {RATIO} (ref 2–5)
CO2 SERPL-SCNC: 28 MMOL/L (ref 23–29)
CREAT SERPL-MCNC: 1.2 MG/DL (ref 0.5–1.4)
ERYTHROCYTE [DISTWIDTH] IN BLOOD BY AUTOMATED COUNT: 13 % (ref 11.5–14.5)
EST. GFR  (AFRICAN AMERICAN): >60 ML/MIN/1.73 M^2
EST. GFR  (NON AFRICAN AMERICAN): >60 ML/MIN/1.73 M^2
ESTIMATED AVG GLUCOSE: 151 MG/DL (ref 68–131)
GLUCOSE SERPL-MCNC: 139 MG/DL (ref 70–110)
HBA1C MFR BLD: 6.9 % (ref 4–5.6)
HCT VFR BLD AUTO: 48 % (ref 40–54)
HDLC SERPL-MCNC: 36 MG/DL (ref 40–75)
HDLC SERPL: 25 % (ref 20–50)
HGB BLD-MCNC: 15.9 G/DL (ref 14–18)
LDLC SERPL CALC-MCNC: 79.4 MG/DL (ref 63–159)
MCH RBC QN AUTO: 31.2 PG (ref 27–31)
MCHC RBC AUTO-ENTMCNC: 33.1 G/DL (ref 32–36)
MCV RBC AUTO: 94 FL (ref 82–98)
NONHDLC SERPL-MCNC: 108 MG/DL
PLATELET # BLD AUTO: 200 K/UL (ref 150–450)
PMV BLD AUTO: 10 FL (ref 9.2–12.9)
POTASSIUM SERPL-SCNC: 4.6 MMOL/L (ref 3.5–5.1)
PROT SERPL-MCNC: 7.6 G/DL (ref 6–8.4)
RBC # BLD AUTO: 5.09 M/UL (ref 4.6–6.2)
SODIUM SERPL-SCNC: 138 MMOL/L (ref 136–145)
T4 FREE SERPL-MCNC: 0.98 NG/DL (ref 0.71–1.51)
TRIGL SERPL-MCNC: 143 MG/DL (ref 30–150)
TSH SERPL DL<=0.005 MIU/L-ACNC: 1.81 UIU/ML (ref 0.4–4)
WBC # BLD AUTO: 9.67 K/UL (ref 3.9–12.7)

## 2021-07-15 PROCEDURE — 83036 HEMOGLOBIN GLYCOSYLATED A1C: CPT | Performed by: FAMILY MEDICINE

## 2021-07-15 PROCEDURE — 80061 LIPID PANEL: CPT | Performed by: FAMILY MEDICINE

## 2021-07-15 PROCEDURE — 36415 COLL VENOUS BLD VENIPUNCTURE: CPT | Mod: PO | Performed by: FAMILY MEDICINE

## 2021-07-15 PROCEDURE — 80053 COMPREHEN METABOLIC PANEL: CPT | Performed by: FAMILY MEDICINE

## 2021-07-15 PROCEDURE — 84439 ASSAY OF FREE THYROXINE: CPT | Performed by: FAMILY MEDICINE

## 2021-07-15 PROCEDURE — 84153 ASSAY OF PSA TOTAL: CPT | Performed by: FAMILY MEDICINE

## 2021-07-15 PROCEDURE — 84443 ASSAY THYROID STIM HORMONE: CPT | Performed by: FAMILY MEDICINE

## 2021-07-15 PROCEDURE — 85027 COMPLETE CBC AUTOMATED: CPT | Performed by: FAMILY MEDICINE

## 2021-07-16 LAB — COMPLEXED PSA SERPL-MCNC: 0.39 NG/ML (ref 0–4)

## 2021-07-27 ENCOUNTER — OFFICE VISIT (OUTPATIENT)
Dept: FAMILY MEDICINE | Facility: CLINIC | Age: 61
End: 2021-07-27
Payer: COMMERCIAL

## 2021-07-27 ENCOUNTER — LAB VISIT (OUTPATIENT)
Dept: LAB | Facility: HOSPITAL | Age: 61
End: 2021-07-27
Attending: FAMILY MEDICINE
Payer: COMMERCIAL

## 2021-07-27 VITALS
DIASTOLIC BLOOD PRESSURE: 64 MMHG | SYSTOLIC BLOOD PRESSURE: 128 MMHG | BODY MASS INDEX: 35.23 KG/M2 | HEART RATE: 89 BPM | OXYGEN SATURATION: 96 % | WEIGHT: 251.63 LBS | HEIGHT: 71 IN | TEMPERATURE: 98 F

## 2021-07-27 DIAGNOSIS — E78.5 HYPERLIPIDEMIA, UNSPECIFIED HYPERLIPIDEMIA TYPE: Chronic | ICD-10-CM

## 2021-07-27 DIAGNOSIS — Z00.00 ROUTINE PHYSICAL EXAMINATION: Primary | ICD-10-CM

## 2021-07-27 DIAGNOSIS — I10 ESSENTIAL HYPERTENSION: Chronic | ICD-10-CM

## 2021-07-27 DIAGNOSIS — E66.01 SEVERE OBESITY (BMI 35.0-35.9 WITH COMORBIDITY): ICD-10-CM

## 2021-07-27 PROBLEM — G47.33 OSA (OBSTRUCTIVE SLEEP APNEA): Chronic | Status: ACTIVE | Noted: 2021-01-27

## 2021-07-27 PROCEDURE — 3078F DIAST BP <80 MM HG: CPT | Mod: CPTII,S$GLB,, | Performed by: FAMILY MEDICINE

## 2021-07-27 PROCEDURE — 99396 PREV VISIT EST AGE 40-64: CPT | Mod: S$GLB,,, | Performed by: FAMILY MEDICINE

## 2021-07-27 PROCEDURE — 1126F PR PAIN SEVERITY QUANTIFIED, NO PAIN PRESENT: ICD-10-PCS | Mod: CPTII,S$GLB,, | Performed by: FAMILY MEDICINE

## 2021-07-27 PROCEDURE — 3044F HG A1C LEVEL LT 7.0%: CPT | Mod: CPTII,S$GLB,, | Performed by: FAMILY MEDICINE

## 2021-07-27 PROCEDURE — 3074F PR MOST RECENT SYSTOLIC BLOOD PRESSURE < 130 MM HG: ICD-10-PCS | Mod: CPTII,S$GLB,, | Performed by: FAMILY MEDICINE

## 2021-07-27 PROCEDURE — 3078F PR MOST RECENT DIASTOLIC BLOOD PRESSURE < 80 MM HG: ICD-10-PCS | Mod: CPTII,S$GLB,, | Performed by: FAMILY MEDICINE

## 2021-07-27 PROCEDURE — 82570 ASSAY OF URINE CREATININE: CPT | Performed by: FAMILY MEDICINE

## 2021-07-27 PROCEDURE — 1126F AMNT PAIN NOTED NONE PRSNT: CPT | Mod: CPTII,S$GLB,, | Performed by: FAMILY MEDICINE

## 2021-07-27 PROCEDURE — 3074F SYST BP LT 130 MM HG: CPT | Mod: CPTII,S$GLB,, | Performed by: FAMILY MEDICINE

## 2021-07-27 PROCEDURE — 82043 UR ALBUMIN QUANTITATIVE: CPT | Performed by: FAMILY MEDICINE

## 2021-07-27 PROCEDURE — 3044F PR MOST RECENT HEMOGLOBIN A1C LEVEL <7.0%: ICD-10-PCS | Mod: CPTII,S$GLB,, | Performed by: FAMILY MEDICINE

## 2021-07-27 PROCEDURE — 3008F PR BODY MASS INDEX (BMI) DOCUMENTED: ICD-10-PCS | Mod: CPTII,S$GLB,, | Performed by: FAMILY MEDICINE

## 2021-07-27 PROCEDURE — 99999 PR PBB SHADOW E&M-EST. PATIENT-LVL IV: ICD-10-PCS | Mod: PBBFAC,,, | Performed by: FAMILY MEDICINE

## 2021-07-27 PROCEDURE — 1159F MED LIST DOCD IN RCRD: CPT | Mod: CPTII,S$GLB,, | Performed by: FAMILY MEDICINE

## 2021-07-27 PROCEDURE — 1159F PR MEDICATION LIST DOCUMENTED IN MEDICAL RECORD: ICD-10-PCS | Mod: CPTII,S$GLB,, | Performed by: FAMILY MEDICINE

## 2021-07-27 PROCEDURE — 99396 PR PREVENTIVE VISIT,EST,40-64: ICD-10-PCS | Mod: S$GLB,,, | Performed by: FAMILY MEDICINE

## 2021-07-27 PROCEDURE — 3008F BODY MASS INDEX DOCD: CPT | Mod: CPTII,S$GLB,, | Performed by: FAMILY MEDICINE

## 2021-07-27 PROCEDURE — 99999 PR PBB SHADOW E&M-EST. PATIENT-LVL IV: CPT | Mod: PBBFAC,,, | Performed by: FAMILY MEDICINE

## 2021-07-28 PROBLEM — E11.29 TYPE 2 DIABETES MELLITUS WITH MICROALBUMINURIA, WITHOUT LONG-TERM CURRENT USE OF INSULIN: Status: ACTIVE | Noted: 2021-07-28

## 2021-07-28 PROBLEM — R80.9 TYPE 2 DIABETES MELLITUS WITH MICROALBUMINURIA, WITHOUT LONG-TERM CURRENT USE OF INSULIN: Status: ACTIVE | Noted: 2021-07-28

## 2021-07-28 LAB
ALBUMIN/CREAT UR: 65.2 UG/MG (ref 0–30)
CREAT UR-MCNC: 69 MG/DL (ref 23–375)
MICROALBUMIN UR DL<=1MG/L-MCNC: 45 UG/ML

## 2021-08-09 ENCOUNTER — PATIENT MESSAGE (OUTPATIENT)
Dept: FAMILY MEDICINE | Facility: CLINIC | Age: 61
End: 2021-08-09

## 2021-08-09 RX ORDER — INSULIN GLARGINE 100 [IU]/ML
INJECTION, SOLUTION SUBCUTANEOUS
Qty: 45 ML | Refills: 0 | Status: SHIPPED | OUTPATIENT
Start: 2021-08-09 | End: 2021-12-13

## 2021-08-12 RX ORDER — INSULIN LISPRO 100 [IU]/ML
INJECTION, SOLUTION INTRAVENOUS; SUBCUTANEOUS
Qty: 60 ML | Refills: 2 | Status: SHIPPED | OUTPATIENT
Start: 2021-08-12

## 2021-08-21 DIAGNOSIS — I10 ESSENTIAL HYPERTENSION: ICD-10-CM

## 2021-08-21 DIAGNOSIS — E78.5 HYPERLIPIDEMIA, UNSPECIFIED HYPERLIPIDEMIA TYPE: ICD-10-CM

## 2021-08-23 RX ORDER — ENALAPRIL MALEATE 20 MG/1
TABLET ORAL
Qty: 180 TABLET | Refills: 3 | Status: SHIPPED | OUTPATIENT
Start: 2021-08-23

## 2021-08-23 RX ORDER — ATORVASTATIN CALCIUM 40 MG/1
TABLET, FILM COATED ORAL
Qty: 90 TABLET | Refills: 3 | Status: SHIPPED | OUTPATIENT
Start: 2021-08-23

## 2021-11-19 DIAGNOSIS — K21.9 GASTROESOPHAGEAL REFLUX DISEASE WITHOUT ESOPHAGITIS: ICD-10-CM

## 2021-11-19 RX ORDER — OMEPRAZOLE 40 MG/1
CAPSULE, DELAYED RELEASE ORAL
Qty: 90 CAPSULE | Refills: 2 | Status: SHIPPED | OUTPATIENT
Start: 2021-11-19

## 2021-12-13 ENCOUNTER — PATIENT MESSAGE (OUTPATIENT)
Dept: FAMILY MEDICINE | Facility: CLINIC | Age: 61
End: 2021-12-13
Payer: COMMERCIAL

## 2021-12-13 DIAGNOSIS — I10 ESSENTIAL HYPERTENSION: ICD-10-CM

## 2021-12-13 DIAGNOSIS — M10.00 IDIOPATHIC GOUT, UNSPECIFIED CHRONICITY, UNSPECIFIED SITE: ICD-10-CM

## 2021-12-13 RX ORDER — ATENOLOL AND CHLORTHALIDONE TABLET 50; 25 MG/1; MG/1
TABLET ORAL
Refills: 0 | OUTPATIENT
Start: 2021-12-13

## 2021-12-13 RX ORDER — AMLODIPINE BESYLATE 10 MG/1
TABLET ORAL
Refills: 0 | OUTPATIENT
Start: 2021-12-13

## 2021-12-13 RX ORDER — ALLOPURINOL 100 MG/1
TABLET ORAL
Refills: 0 | OUTPATIENT
Start: 2021-12-13

## 2021-12-13 RX ORDER — INSULIN GLARGINE 100 [IU]/ML
40 INJECTION, SOLUTION SUBCUTANEOUS NIGHTLY
Qty: 15 ML | Refills: 3 | Status: SHIPPED | OUTPATIENT
Start: 2021-12-13 | End: 2022-01-05 | Stop reason: SDUPTHER

## 2022-01-05 ENCOUNTER — TELEPHONE (OUTPATIENT)
Dept: FAMILY MEDICINE | Facility: CLINIC | Age: 62
End: 2022-01-05
Payer: COMMERCIAL

## 2022-01-05 NOTE — TELEPHONE ENCOUNTER
----- Message from Luna Velásquez sent at 1/5/2022 11:49 AM CST -----  Type: RX Refill Request    [facility] [phone]    Have you contacted your pharmacy:    Refill or New Rx:insulin (SEMGLEE PEN U-100 INSULIN) glargine 100 units/mL (3mL) SubQ pen    RX Name and Strength:    How is the patient currently taking it? (ex. 1XDay):    Is this a 30 day or 90 day RX:    Preferred Pharmacy with phone number:    Local or Mail Order:    Ordering Provider:    Would the patient rather a call back or a response via [facility]?     Best Call Back Number:    Additional Information:

## 2022-01-05 NOTE — TELEPHONE ENCOUNTER
Called pt to talk about medication request that was sent in from his pharmacy. Pt stated that pharmacy does not carry semglee but carries other types of pens with different names. Pt stated that he would need rx signed t0 be able to see receive his prescription.

## 2022-01-05 NOTE — TELEPHONE ENCOUNTER
No new care gaps identified.  Powered by [employer]. Reference number: [id number].   1/05/2022 12:07:09 PM CST

## 2022-01-07 RX ORDER — INSULIN GLARGINE 100 [IU]/ML
40 INJECTION, SOLUTION SUBCUTANEOUS NIGHTLY
Qty: 45 ML | Refills: 0 | Status: SHIPPED | OUTPATIENT
Start: 2022-01-07

## 2022-01-07 NOTE — TELEPHONE ENCOUNTER
Refill Routing Note   Medication(s) are not appropriate for processing by [facility] for the following reason(s):      - Medication is a new start (<3 months)    [facility] action(s):  Defer          --->Care Gap information included in message below if applicable.   Medication reconciliation completed: No   Automatic Epic Generated Protocol Data:        Requested Prescriptions   Pending Prescriptions Disp Refills    insulin (SEMGLEE PEN U-100 INSULIN) glargine 100 units/mL (3mL) SubQ pen 45 mL 0     Sig: Inject 40 Units into the skin every evening.       Endocrinology:  Diabetes - Insulins Passed - 1/5/2022 12:06 PM        Passed - Patient is at least 18 years old        Passed - Valid encounter within last 15 months     Recent Visits  Date Type Provider Dept   07/27/21 Office Visit Zaire Rodrigues MD [facility]   11/16/20 Office Visit Zaire Rodrigues MD [facility]   06/10/20 Office Visit Zaire Rodrigues MD [facility]   Showing recent visits within past 720 days and meeting all other requirements  Future Appointments  No visits were found meeting these conditions.  Showing future appointments within next 150 days and meeting all other requirements                Passed - HBA1C within 180 days     Lab Results   Component Value Date    HGBA1C 6.9 (H) 07/15/2021    HGBA1C 6.4 (H) 03/02/2021    HGBA1C 6.9 (H) 11/02/2020              Passed - eGFR is 30 or above and within 360 days     Lab Results   Component Value Date    EGFRNONAA >60.0 07/15/2021    EGFRNONAA >60.0 07/08/2020    EGFRNONAA >60.0 05/29/2020                Passed - Cr is 1.39 or below and within 360 days     Lab Results   Component Value Date    CREATININE 1.2 07/15/2021    CREATININE 1.2 07/08/2020    CREATININE 1.1 05/29/2020                    Appointments  past 12m or future 3m with PCP    Date Provider   Last Visit   7/27/2021 Zaire Rodrigues MD   Next Visit   Visit  date not found Zaire Rodrigues MD   ED visits in past 90 days: 0        Note composed:9:32 PM 01/06/2022

## 2022-01-13 ENCOUNTER — PATIENT MESSAGE (OUTPATIENT)
Dept: ADMINISTRATIVE | Facility: HOSPITAL | Age: 62
End: 2022-01-13
Payer: COMMERCIAL

## 2022-01-31 DIAGNOSIS — E11.40 TYPE 2 DIABETES, CONTROLLED, WITH NEUROPATHY: ICD-10-CM

## 2022-01-31 RX ORDER — DULAGLUTIDE 1.5 MG/.5ML
INJECTION, SOLUTION SUBCUTANEOUS
Qty: 4 PEN | Refills: 0 | Status: SHIPPED | OUTPATIENT
Start: 2022-01-31

## 2022-02-03 ENCOUNTER — TELEPHONE (OUTPATIENT)
Dept: FAMILY MEDICINE | Facility: CLINIC | Age: 62
End: 2022-02-03
Payer: COMMERCIAL

## 2022-02-03 NOTE — TELEPHONE ENCOUNTER
----- Message from Lakeisha Matamoros sent at 2/3/2022 10:52 AM CST -----  Type: Patient Call Back    Who called: self     What is the request in detail: patient would like to know why his TRULICITY 1.5 mg/0.5 mL pen injector script was not for 90 days. Please call    Can the clinic reply by [facility]? no    Would the patient rather a call back or a response via [facility]? call    Best call back number .[phone]

## 2022-04-14 ENCOUNTER — PATIENT MESSAGE (OUTPATIENT)
Dept: ADMINISTRATIVE | Facility: HOSPITAL | Age: 62
End: 2022-04-14
Payer: COMMERCIAL

## 2022-05-31 ENCOUNTER — PATIENT MESSAGE (OUTPATIENT)
Dept: ADMINISTRATIVE | Facility: HOSPITAL | Age: 62
End: 2022-05-31
Payer: COMMERCIAL

## 2022-07-13 DIAGNOSIS — E11.9 TYPE 2 DIABETES MELLITUS WITHOUT COMPLICATION: ICD-10-CM

## 2022-07-14 ENCOUNTER — PATIENT MESSAGE (OUTPATIENT)
Dept: ADMINISTRATIVE | Facility: HOSPITAL | Age: 62
End: 2022-07-14
Payer: COMMERCIAL

## 2022-07-20 DIAGNOSIS — E11.29 TYPE 2 DIABETES MELLITUS WITH MICROALBUMINURIA, WITHOUT LONG-TERM CURRENT USE OF INSULIN: ICD-10-CM

## 2022-07-20 DIAGNOSIS — R80.9 TYPE 2 DIABETES MELLITUS WITH MICROALBUMINURIA, WITHOUT LONG-TERM CURRENT USE OF INSULIN: ICD-10-CM

## 2022-08-04 ENCOUNTER — PATIENT OUTREACH (OUTPATIENT)
Dept: ADMINISTRATIVE | Facility: HOSPITAL | Age: 62
End: 2022-08-04
Payer: COMMERCIAL

## 2022-08-04 DIAGNOSIS — E11.29 TYPE 2 DIABETES MELLITUS WITH MICROALBUMINURIA, WITHOUT LONG-TERM CURRENT USE OF INSULIN: Primary | ICD-10-CM

## 2022-08-04 DIAGNOSIS — R80.9 TYPE 2 DIABETES MELLITUS WITH MICROALBUMINURIA, WITHOUT LONG-TERM CURRENT USE OF INSULIN: Primary | ICD-10-CM

## 2022-08-04 NOTE — PROGRESS NOTES
Appt needs to be scheduled.   Labs ordered and needs to be scheduled.    Micro urine needs to be completed.   Diabetic eye exam needs to be scheduled.

## 2022-09-20 NOTE — TELEPHONE ENCOUNTER
Care Due:                  Date            Visit Type   Department     Provider  --------------------------------------------------------------------------------                                             [facility]                                           MED/ INTERNAL  Last Visit: 07-      None         MED/ PEDS      MERYL BARNARD  Next Visit: None Scheduled  None         None Found                                                            Last  Test          Frequency    Reason                     Performed    Due Date  --------------------------------------------------------------------------------    HBA1C.......  6 months...  TRULICITY,                 07-   01-                             empagliflozin-metformin,                             insulin..................    Uric Acid...  12 months..  allopurinoL..............  11-   10-    Powered by [employer]. Reference number: [id number].   1/31/2022 1:09:35 AM CST   yes
